# Patient Record
Sex: FEMALE | Race: WHITE | NOT HISPANIC OR LATINO | Employment: FULL TIME | URBAN - NONMETROPOLITAN AREA
[De-identification: names, ages, dates, MRNs, and addresses within clinical notes are randomized per-mention and may not be internally consistent; named-entity substitution may affect disease eponyms.]

---

## 2020-09-16 PROCEDURE — U0004 COV-19 TEST NON-CDC HGH THRU: HCPCS | Performed by: NURSE PRACTITIONER

## 2021-10-31 ENCOUNTER — APPOINTMENT (OUTPATIENT)
Dept: CT IMAGING | Facility: HOSPITAL | Age: 51
End: 2021-10-31

## 2021-10-31 ENCOUNTER — HOSPITAL ENCOUNTER (EMERGENCY)
Facility: HOSPITAL | Age: 51
Discharge: HOME | End: 2021-10-31
Attending: EMERGENCY MEDICINE | Admitting: EMERGENCY MEDICINE

## 2021-10-31 ENCOUNTER — APPOINTMENT (OUTPATIENT)
Dept: GENERAL RADIOLOGY | Facility: HOSPITAL | Age: 51
End: 2021-10-31

## 2021-10-31 VITALS
TEMPERATURE: 98.2 F | SYSTOLIC BLOOD PRESSURE: 110 MMHG | HEIGHT: 66 IN | DIASTOLIC BLOOD PRESSURE: 68 MMHG | OXYGEN SATURATION: 94 % | HEART RATE: 74 BPM | RESPIRATION RATE: 18 BRPM | BODY MASS INDEX: 35.58 KG/M2 | WEIGHT: 221.4 LBS

## 2021-10-31 DIAGNOSIS — R53.1 WEAKNESS: Primary | ICD-10-CM

## 2021-10-31 LAB
ALBUMIN SERPL-MCNC: 4.5 G/DL (ref 3.5–5.2)
ALBUMIN/GLOB SERPL: 1.6 G/DL
ALP SERPL-CCNC: 89 U/L (ref 39–117)
ALT SERPL W P-5'-P-CCNC: 42 U/L (ref 1–33)
ANION GAP SERPL CALCULATED.3IONS-SCNC: 11.1 MMOL/L (ref 5–15)
AST SERPL-CCNC: 38 U/L (ref 1–32)
BACTERIA UR QL AUTO: ABNORMAL /HPF
BASOPHILS # BLD AUTO: 0.09 10*3/MM3 (ref 0–0.2)
BASOPHILS NFR BLD AUTO: 0.9 % (ref 0–1.5)
BILIRUB SERPL-MCNC: 0.3 MG/DL (ref 0–1.2)
BILIRUB UR QL STRIP: NEGATIVE
BUN SERPL-MCNC: 10 MG/DL (ref 6–20)
BUN/CREAT SERPL: 10.4 (ref 7–25)
CALCIUM SPEC-SCNC: 9.4 MG/DL (ref 8.6–10.5)
CHLORIDE SERPL-SCNC: 105 MMOL/L (ref 98–107)
CLARITY UR: CLEAR
CO2 SERPL-SCNC: 24.9 MMOL/L (ref 22–29)
COLOR UR: YELLOW
CREAT SERPL-MCNC: 0.96 MG/DL (ref 0.57–1)
DEPRECATED RDW RBC AUTO: 43.3 FL (ref 37–54)
EOSINOPHIL # BLD AUTO: 0.38 10*3/MM3 (ref 0–0.4)
EOSINOPHIL NFR BLD AUTO: 3.9 % (ref 0.3–6.2)
ERYTHROCYTE [DISTWIDTH] IN BLOOD BY AUTOMATED COUNT: 13.9 % (ref 12.3–15.4)
GFR SERPL CREATININE-BSD FRML MDRD: 61 ML/MIN/1.73
GLOBULIN UR ELPH-MCNC: 2.9 GM/DL
GLUCOSE SERPL-MCNC: 120 MG/DL (ref 65–99)
GLUCOSE UR STRIP-MCNC: NEGATIVE MG/DL
HCT VFR BLD AUTO: 48.2 % (ref 34–46.6)
HGB BLD-MCNC: 15.8 G/DL (ref 12–15.9)
HGB UR QL STRIP.AUTO: NEGATIVE
HOLD SPECIMEN: NORMAL
HOLD SPECIMEN: NORMAL
HYALINE CASTS UR QL AUTO: ABNORMAL /LPF
IMM GRANULOCYTES # BLD AUTO: 0.03 10*3/MM3 (ref 0–0.05)
IMM GRANULOCYTES NFR BLD AUTO: 0.3 % (ref 0–0.5)
KETONES UR QL STRIP: NEGATIVE
LEUKOCYTE ESTERASE UR QL STRIP.AUTO: ABNORMAL
LYMPHOCYTES # BLD AUTO: 2.32 10*3/MM3 (ref 0.7–3.1)
LYMPHOCYTES NFR BLD AUTO: 23.7 % (ref 19.6–45.3)
MAGNESIUM SERPL-MCNC: 1.9 MG/DL (ref 1.6–2.6)
MCH RBC QN AUTO: 28 PG (ref 26.6–33)
MCHC RBC AUTO-ENTMCNC: 32.8 G/DL (ref 31.5–35.7)
MCV RBC AUTO: 85.3 FL (ref 79–97)
MONOCYTES # BLD AUTO: 0.49 10*3/MM3 (ref 0.1–0.9)
MONOCYTES NFR BLD AUTO: 5 % (ref 5–12)
NEUTROPHILS NFR BLD AUTO: 6.46 10*3/MM3 (ref 1.7–7)
NEUTROPHILS NFR BLD AUTO: 66.2 % (ref 42.7–76)
NITRITE UR QL STRIP: NEGATIVE
NRBC BLD AUTO-RTO: 0 /100 WBC (ref 0–0.2)
PH UR STRIP.AUTO: 7 [PH] (ref 5–8)
PLATELET # BLD AUTO: 210 10*3/MM3 (ref 140–450)
PMV BLD AUTO: 10.7 FL (ref 6–12)
POTASSIUM SERPL-SCNC: 3.7 MMOL/L (ref 3.5–5.2)
PROT SERPL-MCNC: 7.4 G/DL (ref 6–8.5)
PROT UR QL STRIP: NEGATIVE
RBC # BLD AUTO: 5.65 10*6/MM3 (ref 3.77–5.28)
RBC # UR: ABNORMAL /HPF
REF LAB TEST METHOD: ABNORMAL
SODIUM SERPL-SCNC: 141 MMOL/L (ref 136–145)
SP GR UR STRIP: <=1.005 (ref 1–1.03)
SQUAMOUS #/AREA URNS HPF: ABNORMAL /HPF
T4 FREE SERPL-MCNC: 0.98 NG/DL (ref 0.93–1.7)
TROPONIN T SERPL-MCNC: <0.01 NG/ML (ref 0–0.03)
TSH SERPL DL<=0.05 MIU/L-ACNC: 1.83 UIU/ML (ref 0.27–4.2)
UROBILINOGEN UR QL STRIP: ABNORMAL
WBC # BLD AUTO: 9.77 10*3/MM3 (ref 3.4–10.8)
WBC UR QL AUTO: ABNORMAL /HPF
WHOLE BLOOD HOLD SPECIMEN: NORMAL
WHOLE BLOOD HOLD SPECIMEN: NORMAL

## 2021-10-31 PROCEDURE — 84484 ASSAY OF TROPONIN QUANT: CPT

## 2021-10-31 PROCEDURE — 71045 X-RAY EXAM CHEST 1 VIEW: CPT

## 2021-10-31 PROCEDURE — 80053 COMPREHEN METABOLIC PANEL: CPT

## 2021-10-31 PROCEDURE — 63710000001 ONDANSETRON ODT 4 MG TABLET DISPERSIBLE: Performed by: NURSE PRACTITIONER

## 2021-10-31 PROCEDURE — 84439 ASSAY OF FREE THYROXINE: CPT | Performed by: NURSE PRACTITIONER

## 2021-10-31 PROCEDURE — 81001 URINALYSIS AUTO W/SCOPE: CPT | Performed by: NURSE PRACTITIONER

## 2021-10-31 PROCEDURE — 85025 COMPLETE CBC W/AUTO DIFF WBC: CPT

## 2021-10-31 PROCEDURE — 83735 ASSAY OF MAGNESIUM: CPT

## 2021-10-31 PROCEDURE — 93005 ELECTROCARDIOGRAM TRACING: CPT

## 2021-10-31 PROCEDURE — 84443 ASSAY THYROID STIM HORMONE: CPT | Performed by: NURSE PRACTITIONER

## 2021-10-31 PROCEDURE — 99283 EMERGENCY DEPT VISIT LOW MDM: CPT

## 2021-10-31 PROCEDURE — 70450 CT HEAD/BRAIN W/O DYE: CPT

## 2021-10-31 RX ORDER — ONDANSETRON 4 MG/1
4 TABLET, ORALLY DISINTEGRATING ORAL ONCE
Status: COMPLETED | OUTPATIENT
Start: 2021-10-31 | End: 2021-10-31

## 2021-10-31 RX ORDER — MECLIZINE HYDROCHLORIDE 25 MG/1
25 TABLET ORAL ONCE
Status: COMPLETED | OUTPATIENT
Start: 2021-10-31 | End: 2021-10-31

## 2021-10-31 RX ORDER — SODIUM CHLORIDE 0.9 % (FLUSH) 0.9 %
10 SYRINGE (ML) INJECTION AS NEEDED
Status: DISCONTINUED | OUTPATIENT
Start: 2021-10-31 | End: 2021-10-31 | Stop reason: HOSPADM

## 2021-10-31 RX ADMIN — MECLIZINE HYDROCHLORIDE 25 MG: 25 TABLET ORAL at 15:08

## 2021-10-31 RX ADMIN — ONDANSETRON 4 MG: 4 TABLET, ORALLY DISINTEGRATING ORAL at 15:08

## 2023-01-22 ENCOUNTER — APPOINTMENT (OUTPATIENT)
Dept: GENERAL RADIOLOGY | Facility: HOSPITAL | Age: 53
End: 2023-01-22
Payer: COMMERCIAL

## 2023-01-22 ENCOUNTER — APPOINTMENT (OUTPATIENT)
Dept: CT IMAGING | Facility: HOSPITAL | Age: 53
End: 2023-01-22
Payer: COMMERCIAL

## 2023-01-22 ENCOUNTER — HOSPITAL ENCOUNTER (OUTPATIENT)
Facility: HOSPITAL | Age: 53
Setting detail: OBSERVATION
Discharge: HOME | End: 2023-01-23
Attending: EMERGENCY MEDICINE | Admitting: FAMILY MEDICINE
Payer: COMMERCIAL

## 2023-01-22 DIAGNOSIS — R20.2 PARESTHESIAS: ICD-10-CM

## 2023-01-22 DIAGNOSIS — R42 DIZZINESS: Primary | ICD-10-CM

## 2023-01-22 LAB
ALBUMIN SERPL-MCNC: 4.3 G/DL (ref 3.5–5.2)
ALBUMIN/GLOB SERPL: 1.3 G/DL
ALP SERPL-CCNC: 104 U/L (ref 39–117)
ALT SERPL W P-5'-P-CCNC: 55 U/L (ref 1–33)
ANION GAP SERPL CALCULATED.3IONS-SCNC: 11.6 MMOL/L (ref 5–15)
AST SERPL-CCNC: 47 U/L (ref 1–32)
BACTERIA UR QL AUTO: ABNORMAL /HPF
BASOPHILS # BLD AUTO: 0.08 10*3/MM3 (ref 0–0.2)
BASOPHILS NFR BLD AUTO: 1.1 % (ref 0–1.5)
BILIRUB SERPL-MCNC: 0.3 MG/DL (ref 0–1.2)
BILIRUB UR QL STRIP: NEGATIVE
BUN SERPL-MCNC: 10 MG/DL (ref 6–20)
BUN/CREAT SERPL: 13.7 (ref 7–25)
CALCIUM SPEC-SCNC: 9.4 MG/DL (ref 8.6–10.5)
CHLORIDE SERPL-SCNC: 105 MMOL/L (ref 98–107)
CHOLEST SERPL-MCNC: 224 MG/DL (ref 0–200)
CLARITY UR: CLEAR
CO2 SERPL-SCNC: 23.4 MMOL/L (ref 22–29)
COLOR UR: YELLOW
CREAT SERPL-MCNC: 0.73 MG/DL (ref 0.57–1)
CRP SERPL-MCNC: 0.84 MG/DL (ref 0–0.5)
DEPRECATED RDW RBC AUTO: 45.6 FL (ref 37–54)
EGFRCR SERPLBLD CKD-EPI 2021: 97.9 ML/MIN/1.73
EOSINOPHIL # BLD AUTO: 0.66 10*3/MM3 (ref 0–0.4)
EOSINOPHIL NFR BLD AUTO: 9.1 % (ref 0.3–6.2)
ERYTHROCYTE [DISTWIDTH] IN BLOOD BY AUTOMATED COUNT: 14.3 % (ref 12.3–15.4)
ERYTHROCYTE [SEDIMENTATION RATE] IN BLOOD: 25 MM/HR (ref 0–30)
FLUAV RNA RESP QL NAA+PROBE: NOT DETECTED
FLUBV RNA RESP QL NAA+PROBE: NOT DETECTED
GLOBULIN UR ELPH-MCNC: 3.3 GM/DL
GLUCOSE SERPL-MCNC: 113 MG/DL (ref 65–99)
GLUCOSE UR STRIP-MCNC: NEGATIVE MG/DL
HBA1C MFR BLD: 6.6 % (ref 4.8–5.6)
HCT VFR BLD AUTO: 46 % (ref 34–46.6)
HDLC SERPL-MCNC: 41 MG/DL (ref 40–60)
HGB BLD-MCNC: 14.7 G/DL (ref 12–15.9)
HGB UR QL STRIP.AUTO: NEGATIVE
HOLD SPECIMEN: NORMAL
HOLD SPECIMEN: NORMAL
HYALINE CASTS UR QL AUTO: ABNORMAL /LPF
IMM GRANULOCYTES # BLD AUTO: 0.03 10*3/MM3 (ref 0–0.05)
IMM GRANULOCYTES NFR BLD AUTO: 0.4 % (ref 0–0.5)
KETONES UR QL STRIP: NEGATIVE
LDLC SERPL CALC-MCNC: 144 MG/DL (ref 0–100)
LDLC/HDLC SERPL: 3.4 {RATIO}
LEUKOCYTE ESTERASE UR QL STRIP.AUTO: ABNORMAL
LYMPHOCYTES # BLD AUTO: 2.56 10*3/MM3 (ref 0.7–3.1)
LYMPHOCYTES NFR BLD AUTO: 35.3 % (ref 19.6–45.3)
MCH RBC QN AUTO: 27.7 PG (ref 26.6–33)
MCHC RBC AUTO-ENTMCNC: 32 G/DL (ref 31.5–35.7)
MCV RBC AUTO: 86.6 FL (ref 79–97)
MONOCYTES # BLD AUTO: 0.51 10*3/MM3 (ref 0.1–0.9)
MONOCYTES NFR BLD AUTO: 7 % (ref 5–12)
NEUTROPHILS NFR BLD AUTO: 3.42 10*3/MM3 (ref 1.7–7)
NEUTROPHILS NFR BLD AUTO: 47.1 % (ref 42.7–76)
NITRITE UR QL STRIP: NEGATIVE
NRBC BLD AUTO-RTO: 0 /100 WBC (ref 0–0.2)
NT-PROBNP SERPL-MCNC: 54.1 PG/ML (ref 0–900)
PH UR STRIP.AUTO: 6.5 [PH] (ref 5–8)
PLATELET # BLD AUTO: 197 10*3/MM3 (ref 140–450)
PMV BLD AUTO: 10.7 FL (ref 6–12)
POTASSIUM SERPL-SCNC: 4 MMOL/L (ref 3.5–5.2)
PROCALCITONIN SERPL-MCNC: 0.09 NG/ML (ref 0–0.25)
PROT SERPL-MCNC: 7.6 G/DL (ref 6–8.5)
PROT UR QL STRIP: NEGATIVE
RBC # BLD AUTO: 5.31 10*6/MM3 (ref 3.77–5.28)
RBC # UR STRIP: ABNORMAL /HPF
REF LAB TEST METHOD: ABNORMAL
SARS-COV-2 RNA RESP QL NAA+PROBE: NOT DETECTED
SODIUM SERPL-SCNC: 140 MMOL/L (ref 136–145)
SP GR UR STRIP: <=1.005 (ref 1–1.03)
SQUAMOUS #/AREA URNS HPF: ABNORMAL /HPF
TRIGL SERPL-MCNC: 217 MG/DL (ref 0–150)
TROPONIN T SERPL-MCNC: <0.01 NG/ML (ref 0–0.03)
TSH SERPL DL<=0.05 MIU/L-ACNC: 3.91 UIU/ML (ref 0.27–4.2)
UROBILINOGEN UR QL STRIP: ABNORMAL
VIT B12 BLD-MCNC: 289 PG/ML (ref 211–946)
VLDLC SERPL-MCNC: 39 MG/DL (ref 5–40)
WBC # UR STRIP: ABNORMAL /HPF
WBC NRBC COR # BLD: 7.26 10*3/MM3 (ref 3.4–10.8)
WHOLE BLOOD HOLD COAG: NORMAL
WHOLE BLOOD HOLD SPECIMEN: NORMAL

## 2023-01-22 PROCEDURE — 93005 ELECTROCARDIOGRAM TRACING: CPT

## 2023-01-22 PROCEDURE — 99223 1ST HOSP IP/OBS HIGH 75: CPT | Performed by: NURSE PRACTITIONER

## 2023-01-22 PROCEDURE — 82607 VITAMIN B-12: CPT | Performed by: NURSE PRACTITIONER

## 2023-01-22 PROCEDURE — 84484 ASSAY OF TROPONIN QUANT: CPT | Performed by: EMERGENCY MEDICINE

## 2023-01-22 PROCEDURE — 84145 PROCALCITONIN (PCT): CPT | Performed by: EMERGENCY MEDICINE

## 2023-01-22 PROCEDURE — 80050 GENERAL HEALTH PANEL: CPT | Performed by: EMERGENCY MEDICINE

## 2023-01-22 PROCEDURE — 36415 COLL VENOUS BLD VENIPUNCTURE: CPT

## 2023-01-22 PROCEDURE — G0378 HOSPITAL OBSERVATION PER HR: HCPCS

## 2023-01-22 PROCEDURE — 71045 X-RAY EXAM CHEST 1 VIEW: CPT

## 2023-01-22 PROCEDURE — 85652 RBC SED RATE AUTOMATED: CPT | Performed by: EMERGENCY MEDICINE

## 2023-01-22 PROCEDURE — 83880 ASSAY OF NATRIURETIC PEPTIDE: CPT | Performed by: EMERGENCY MEDICINE

## 2023-01-22 PROCEDURE — 99285 EMERGENCY DEPT VISIT HI MDM: CPT

## 2023-01-22 PROCEDURE — 99284 EMERGENCY DEPT VISIT MOD MDM: CPT

## 2023-01-22 PROCEDURE — 83036 HEMOGLOBIN GLYCOSYLATED A1C: CPT | Performed by: NURSE PRACTITIONER

## 2023-01-22 PROCEDURE — 87086 URINE CULTURE/COLONY COUNT: CPT | Performed by: EMERGENCY MEDICINE

## 2023-01-22 PROCEDURE — 93005 ELECTROCARDIOGRAM TRACING: CPT | Performed by: EMERGENCY MEDICINE

## 2023-01-22 PROCEDURE — C9803 HOPD COVID-19 SPEC COLLECT: HCPCS

## 2023-01-22 PROCEDURE — 70450 CT HEAD/BRAIN W/O DYE: CPT

## 2023-01-22 PROCEDURE — 81001 URINALYSIS AUTO W/SCOPE: CPT | Performed by: EMERGENCY MEDICINE

## 2023-01-22 PROCEDURE — 87636 SARSCOV2 & INF A&B AMP PRB: CPT | Performed by: EMERGENCY MEDICINE

## 2023-01-22 PROCEDURE — 86140 C-REACTIVE PROTEIN: CPT | Performed by: EMERGENCY MEDICINE

## 2023-01-22 PROCEDURE — 80061 LIPID PANEL: CPT | Performed by: NURSE PRACTITIONER

## 2023-01-22 RX ORDER — ACETAMINOPHEN 325 MG/1
650 TABLET ORAL EVERY 4 HOURS PRN
Status: DISCONTINUED | OUTPATIENT
Start: 2023-01-22 | End: 2023-01-23 | Stop reason: HOSPADM

## 2023-01-22 RX ORDER — SODIUM CHLORIDE 0.9 % (FLUSH) 0.9 %
10 SYRINGE (ML) INJECTION AS NEEDED
Status: DISCONTINUED | OUTPATIENT
Start: 2023-01-22 | End: 2023-01-23 | Stop reason: HOSPADM

## 2023-01-22 RX ORDER — NICOTINE 21 MG/24HR
1 PATCH, TRANSDERMAL 24 HOURS TRANSDERMAL DAILY PRN
Status: DISCONTINUED | OUTPATIENT
Start: 2023-01-22 | End: 2023-01-23 | Stop reason: HOSPADM

## 2023-01-22 RX ORDER — CEPHALEXIN 250 MG/1
500 CAPSULE ORAL ONCE
Status: COMPLETED | OUTPATIENT
Start: 2023-01-22 | End: 2023-01-22

## 2023-01-22 RX ORDER — SODIUM CHLORIDE 9 MG/ML
40 INJECTION, SOLUTION INTRAVENOUS AS NEEDED
Status: DISCONTINUED | OUTPATIENT
Start: 2023-01-22 | End: 2023-01-23 | Stop reason: HOSPADM

## 2023-01-22 RX ORDER — SODIUM CHLORIDE 0.9 % (FLUSH) 0.9 %
10 SYRINGE (ML) INJECTION EVERY 12 HOURS SCHEDULED
Status: DISCONTINUED | OUTPATIENT
Start: 2023-01-22 | End: 2023-01-23 | Stop reason: HOSPADM

## 2023-01-22 RX ORDER — CHOLECALCIFEROL (VITAMIN D3) 125 MCG
5 CAPSULE ORAL NIGHTLY PRN
Status: DISCONTINUED | OUTPATIENT
Start: 2023-01-22 | End: 2023-01-23 | Stop reason: HOSPADM

## 2023-01-22 RX ORDER — SODIUM CHLORIDE 0.9 % (FLUSH) 0.9 %
10 SYRINGE (ML) INJECTION AS NEEDED
Status: DISCONTINUED | OUTPATIENT
Start: 2023-01-22 | End: 2023-01-23 | Stop reason: SDUPTHER

## 2023-01-22 RX ADMIN — Medication 10 ML: at 22:06

## 2023-01-22 RX ADMIN — RIVAROXABAN 20 MG: 10 TABLET, FILM COATED ORAL at 13:24

## 2023-01-22 RX ADMIN — CEPHALEXIN 500 MG: 250 CAPSULE ORAL at 13:24

## 2023-01-22 NOTE — CONSULTS
Teleneurology Phone Consultation Note    Date of Consultation: 1/22/2023  Requesting Attending: Ramya Reich, *  Chief Complaint: Light-headedness, left arm heaviness with numbness / paresthesias.   Location of patient: Emergency Room  Last known well date/Time if applicable: Unclear, sx for 72 hrs.   Date/Time Telestroke Doctor on phone: 1/22/2023 @ 1040    Assessment and Plan:  TNK decision: No Outside window for tPA  Intervention Decision: Symptoms > 72 hrs, not c/w LVO syndrome.     Impression: Patient with history of antiphospholipid syndrome, alpha-1-antitrypsin deficiency syndrome, prior thrombotic events on Xarelto, and systemic sarcoidosis presents to the ED today with persistent symptoms of dizziness (lightheadedness) and left arm numbness / tingling with sensation of heaviness over the past three days. Some early fluctuation in left arm symptoms during the first 24 hrs - symptoms have reportedly been stable and persistent for 48 hrs. ED MD evaluation remarkable for right facial asymmetry (baseline per patient) and numbness / sensory discrepancy on left (NIHSS 2). SBPs 150s with HR 70s. No fever at triage.     Recommendations  - Continue neuro assessments and vital signs Q4 hrs with continuous telemetry. Please notify on-call Neurology with any abrupt change in exam.   - NCCT head to r/o ischemic, hemorrhagic processes in ED.   - CTA of the head and neck with any significant increase in neurological deficits - symptoms reported stable over the past 48 hrs and non-disabling. Would also recommend CTA if any evidence for ischemic stroke on MRI.   - Recommend MRI of the brain and cervical spine with and without contrast - r/o ischemic, evaluate for extremity symptoms due to autoimmune demyelinating disease involving brain / spine.   - Given systemic AC therapy and sx > 48 hrs, recommend mild permissive HTN to SBP < 150 mmHg x additional 24-48 hrs pending MRI evaluation, provided no contraindications  to permissive HTN (severe CHF, CAD, etc).   - Following initial period of permissive HTN, slow titration of oral antihypertensive medications to goal BP < 130/80 mmHg as tolerated.   - Continue Xarelto as per home regimen - add atorvastatin 80 mg PO QHS if evidence for ischemic stroke on MRI.   - Stroke labs: Hgb A1c / lipid panel / TSH, free T4 / Vit B12 / Vit D level. Check ESR, CRP, procalcitonin, U/A, CXR, and screen for COVID and influenza.  - Request records from outside Rheumatology, Pulmonology, and/or Neurology evaluation as available.    - Transthoracic echo if evidence for stroke.    - Delirium precautions: Lights on, shades open from 0700 to 1900 daily with frequent reorientation. Typical risk factors for delirium include advanced age, premorbid neurological disease, significant impairment in hearing or vision, critical illness, or prolonged hospital admission.   - Continued PT/OT/SLP evaluation for any post-discharge rehab needs.   - Will need follow-up evaluation in Neurology Clinic w/in 4-6 weeks following discharge.     Thank you for the opportunity to participate in the care of this patient. We will follow along with you.     If you have any questions about the patient recommendations, please call [phone] at anytime and ask to speak with the neurologist on call. Press 1 for an emergent consult and 2 for a non-emergent consult.    Chelsi Goncalves MD   Teleneurology Consultant     Teleneurology Disclaimer  Initial recommendations are based on my read of the source images. As additional reconstructions are generated, the final report of the neuroimages may change. Primary service to contact me back if the final report from radiology differs from my preliminary reading.     Also, these recommendations were developed based on phone conversation with the patient provider at the time of the initial consultation. The treatment plan and investigations may need to be modified based on additional  information that may become available during the course of this hospitalization. We defer further workup/management to the local neurology team.     I, Chelsi Goncalves MD, was consulted about this patient on 1/22/2023 for discussion over the phone regarding management of the patient's care via a provider to provider discussion. The location of the patient was at [facility] My location was [place], [place]. The recommendations are based off information I received from the consulting provider.

## 2023-01-22 NOTE — ED PROVIDER NOTES
TRIAGE CHIEF COMPLAINT:     Nursing and triage notes reviewed    Chief Complaint   Patient presents with   • Shortness of Breath   • Dizziness   • Tingling      HPI: Shanon Bean is a 52 y.o. female who presents to the emergency department complaining of shortness of breath, dizziness, tingling and heaviness of her left upper extremity.  Patient states that symptoms have been intermittently present over the past 3 days, however they have been constantly present since yesterday evening.  Patient states it feels like her left arm is asleep and it feels heavy and tingly but does not feel completely numb.  She states it feels difficult to take a deep breath but she denies any pain with breathing.  She denies any worsening cough.  She denies chest pain.  She denies headache, changes in her vision.  She denies any pain in her neck.    REVIEW OF SYSTEMS: All other systems reviewed and are negative     PAST MEDICAL HISTORY:   Past Medical History:   Diagnosis Date   • Alpha 1-antitrypsin PiMS phenotype    • Anti-phospholipid syndrome (HCC)    • Blood clotting disorder (HCC)    • Sarcoidosis         FAMILY HISTORY:   History reviewed. No pertinent family history.     SOCIAL HISTORY:   Social History     Socioeconomic History   • Marital status:    Tobacco Use   • Smoking status: Every Day     Packs/day: 0.50     Types: Cigarettes   Vaping Use   • Vaping Use: Never used   Substance and Sexual Activity   • Alcohol use: Never   • Drug use: Never   • Sexual activity: Defer        SURGICAL HISTORY:   Past Surgical History:   Procedure Laterality Date   •  SECTION     • CHOLECYSTECTOMY     • SINUS SURGERY          CURRENT MEDICATIONS:      Medication List      ASK your doctor about these medications    Xarelto 20 MG tablet  Generic drug: rivaroxaban             ALLERGIES: Penicillins and Sulfa antibiotics     PHYSICAL EXAM:   VITAL SIGNS:   Vitals:    23 0952   BP: 152/87   Pulse: 77   Resp: 16   Temp: 98 °F  (36.7 °C)   SpO2: 97%      CONSTITUTIONAL: Awake, oriented, appears nontoxic   HENT: Atraumatic, normocephalic, oral mucosa pink and moist, airway patent. Nares patent without drainage. External ears normal.   EYES: Conjunctivae clear, EOMI, PERRL   NECK: Trachea midline, nontender, supple   CARDIOVASCULAR: Normal heart rate, Normal rhythm, No murmurs, rubs, gallops   PULMONARY/CHEST: Clear to auscultation, no rhonchi, wheezes, or rales. Symmetrical breath sounds.    ABDOMINAL: Nondistended, soft, nontender - no rebound or guarding.  NEUROLOGIC: Patient has some slight asymmetry to her smile, she states that she has a history of nerve damage and this is not different from her baseline.  Otherwise no cranial nerve deficits appreciated.  Patient has no pronator drift or weakness in the left upper extremity when compared to the right.  She does describe [place] for instance sensation with touching on the opposite extremities.  Lower extremities are normal in strength and sensation.  EXTREMITIES: No clubbing, cyanosis, or edema   SKIN: Warm, Dry, No erythema, No rash     ED COURSE / MEDICAL DECISION MAKING:   Shanon Bean is a 52 y.o. female who presents to the emergency department for evaluation of dizziness, shortness of breath, paresthesias and heaviness of the left upper extremity.  Patient appears nondistressed on arrival in the emergency department.  Vital signs are stable on arrival.  Physical examination does reveal some difference in sensation in the extremities but otherwise no acute new deficit.  Overall patient appears well.    Differential diagnosis includes CVA, vertigo, anxiety, ACS, pneumonia among other etiologies.    CT scan of patient's head, chest x-ray, EKG, was ordered for further evaluation of the patient's presentation.    Chart review including any outside testing was performed including previous imaging studies and notes.  I did consult with the neurologist on-call after patient's interview and  examination.  They have recommended also adding a CRP, sed rate, TSH, urinalysis, and COVID and influenza screen.  They recommended admission for further work-up as well as MRI of the head and cervical spine    Patient was treated with observation initially on arrival.    EKG interpreted by me reveals sinus rhythm with rate of 75 bpm.  There is low QRS voltage in chest leads.  This is an atypical appearing EKG.    Laboratory testing per my evaluation does reveal some evidence of a urinary infection, patient was started on antibiotics for this.  COVID and influenza screens are negative.  Inflammatory markers are largely within the normal range as are cardiac enzymes.  Labs are otherwise largely clinically unremarkable.  Chest x-ray per radiology interpretation does not reveal any obvious acute process at this time.  CT scan of the head per radiology interpretation does not reveal any obvious acute process.  I discussed all of these findings with patient and rationale for admission to the hospital.  Patient did express understanding.  Patient was in agreement with this plan.  In addition to consulting the neurologist I spoke with the hospitalist and discussed the case.  The hospitalist service has agreed to admit the patient for further treatment and evaluation.    Plan for disposition is admission    DECISION TO DISCHARGE/ADMIT: see ED care timeline     FINAL IMPRESSION:   1 --dizziness  2 --paresthesias  3 --     Electronically signed by: Ramya Reich MD, 1/22/2023 10:12 Ramya Morales MD  01/22/23 0657

## 2023-01-22 NOTE — ED NOTES
Called [facility] at this time per Md Damir request. Call unanswered. Left voicemail requesting call back.

## 2023-01-22 NOTE — PLAN OF CARE
Goal Outcome Evaluation:  Plan of Care Reviewed With: patient      Interventions implemented as appropriate.

## 2023-01-23 ENCOUNTER — APPOINTMENT (OUTPATIENT)
Dept: MRI IMAGING | Facility: HOSPITAL | Age: 53
End: 2023-01-23
Payer: COMMERCIAL

## 2023-01-23 VITALS
TEMPERATURE: 98 F | HEIGHT: 66 IN | WEIGHT: 215.39 LBS | RESPIRATION RATE: 18 BRPM | OXYGEN SATURATION: 96 % | DIASTOLIC BLOOD PRESSURE: 71 MMHG | HEART RATE: 57 BPM | BODY MASS INDEX: 34.62 KG/M2 | SYSTOLIC BLOOD PRESSURE: 120 MMHG

## 2023-01-23 PROBLEM — R20.2 PARESTHESIAS: Status: ACTIVE | Noted: 2023-01-23

## 2023-01-23 LAB — BACTERIA SPEC AEROBE CULT: NORMAL

## 2023-01-23 PROCEDURE — 99239 HOSP IP/OBS DSCHRG MGMT >30: CPT | Performed by: NURSE PRACTITIONER

## 2023-01-23 PROCEDURE — 70551 MRI BRAIN STEM W/O DYE: CPT

## 2023-01-23 PROCEDURE — G0378 HOSPITAL OBSERVATION PER HR: HCPCS

## 2023-01-23 PROCEDURE — 72141 MRI NECK SPINE W/O DYE: CPT

## 2023-01-23 RX ADMIN — RIVAROXABAN 20 MG: 10 TABLET, FILM COATED ORAL at 11:33

## 2023-01-23 NOTE — PROGRESS NOTES
TeleNeuro Brief Note    Patient down in MRI, could not do video visit.    Reviewed chart hx of APLS, on Xarelto.  Presented with lightheadedness and numbness tingling.    Getting MRI brain and C spine  If both neg, no further neuro workup needed.  Continue current regimen. Follow up outpatient.    Cristobal Feliz MD

## 2023-01-23 NOTE — DISCHARGE SUMMARY
[facility]   DISCHARGE SUMMARY      Name:  Shanon Bean   Age:  52 y.o.  Sex:  female  :  1970  MRN:  [id number]   Visit Number:  [id number]    Admission Date:  2023  Date of Discharge:  2023  Primary Care Physician:  Carmen Barney MD    Important issues to note:    -Patient admitted secondary to left upper extremity paresthesias greater than right.  -Given past medical history neurology recommended MRI of brain prior to discharge which was negative.  -MRI of C-spine did reveal several protrusions for which patient will follow with neurosurgery if needed.  -Recommend outpatient nerve conduction studies for bilateral paresthesias as could be related to carpal tunnel syndrome.  -Increase water intake.  -Tobacco cessation encouraged.  -Patient to follow with PCP in regards to prediabetes with A1c 6.6 and hyperlipidemia.  -Strict return precautions given.    Discharge Diagnoses:     1. Bilateral arm paraesthesias, POA  2. Dizziness, POA  3. Antiphospholipid Syndrome  4. Alpha Antitrypsin 1 Deficiency  5. Tobacco dependence  6. History of DVT's on Xarelto  7.  Sarcoidosis  8. Prediabetes  9. HLD      Problem List:     Active Hospital Problems    Diagnosis  POA   • **Dizziness [R42]  Yes   • Paresthesias [R20.2]  Yes      Resolved Hospital Problems   No resolved problems to display.     Presenting Problem:    Chief Complaint   Patient presents with   • Shortness of Breath   • Dizziness   • Tingling      Consults:     Consulting Physician(s)     Provider Relationship Specialty    Chelsi Goncalves MD Consulting Physician Neurology        Procedures Performed:        History of presenting illness/Hospital Course:    Mrs. Sandoval is a 52-year-old female that presented to the emergency department with a 3-day history of left arm heaviness/numbness.  Pertinent past medical history includes sarcoidosis for which she follows with Dr. 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  Patient also is experiencing associated dizziness and lightheadedness.  Stated that she has numbness and heaviness up to her left bicep which is constant.  Of note patient also has some numbness to her right hand which is intermittent.  Patient denied any associated chest pain, shortness of air, fever, anxiety, urinary symptoms, confusion, or unilateral weakness.  Given patient prior history patient became very concerned as her left upper extremity numbness was worsening.  Of note patient also had similar episode in October 2021.     Upon ED presentation patient hemodynamically stable on room air.  Pertinent labs and imaging include negative COVID-19/influenza swab, urinalysis without pattern of infection noted, mildly elevated LFTs, CRP 0.84, TSH/procalcitonin /BNP /troponin WNL.  CT of head without any evidence of acute hemorrhage mass-effect or midline shift.  Given history neurology was consulted and recommended MRI of brain with C-spine in the a.m.  Hospitalist consulted for further medical management.  MRI brain without any acute abnormalities.  MRI of C-spine revealed right paracentral protrusion at C6-C7.  Small central protrusions at C4-C5.  Patient updated on results and encouraged to follow with PCP for outpatient EMG.  Patient also scheduled to see neurosurgery if needed.  Encouraged patient to discuss with PCP prediabetes and hyperlipidemia noted during hospitalization for therapeutic lifestyle changes versus new medications.  Patient without any further neurological deficits.  Strict return precautions given.    Vital Signs:    Temp:  [97.6 °F (36.4 °C)-98.6 °F (37 °C)] 98 °F (36.7 °C)  Heart Rate:  [57-84] 57  Resp:  [18-20] 18  BP: (112-153)/(60-71) 120/71    Physical Exam:    General Appearance:  Alert and cooperative.  Pleasant middle-aged  female.   Head:  Atraumatic and normocephalic.  Chronic right-sided facial droop.   Eyes: Conjunctivae and sclerae normal, no icterus. No pallor.   Ears:  Ears with no abnormalities noted.   Throat: No oral lesions, no thrush, oral mucosa moist.   Neck: Supple, trachea midline, no thyromegaly.   Back:   No kyphoscoliosis present. No tenderness to palpation.   Lungs:   Breath sounds heard bilaterally equally.  No crackles or wheezing. No Pleural rub or bronchial breathing.  On room air unlabored.   Heart:  Normal S1 and S2, no murmur, no gallop, no rub. No JVD.   Abdomen:   Normal bowel sounds, no masses, no organomegaly. Soft, nontender, nondistended, no rebound tenderness.   Extremities: Supple, no edema, no cyanosis, no clubbing.   Pulses: Pulses palpable bilaterally.   Skin: No bleeding or rash.   Neurologic: Alert and oriented x 3. No facial asymmetry. Moves all four limbs. No tremors.     Pertinent Lab Results:     Results from last 7 days   Lab Units 01/22/23  1007   SODIUM mmol/L 140   POTASSIUM mmol/L 4.0   CHLORIDE mmol/L 105   CO2 mmol/L 23.4   BUN mg/dL 10   CREATININE mg/dL 0.73   CALCIUM mg/dL 9.4   BILIRUBIN mg/dL 0.3   ALK PHOS U/L 104   ALT (SGPT) U/L 55*   AST (SGOT) U/L 47*   GLUCOSE mg/dL 113*     Results from last 7 days   Lab Units 01/22/23  1007   WBC 10*3/mm3 7.26   HEMOGLOBIN g/dL 14.7   HEMATOCRIT % 46.0   PLATELETS 10*3/mm3 197         Results from last 7 days   Lab Units 01/22/23  1007   TROPONIN T ng/mL <0.010     Results from last 7 days   Lab Units 01/22/23  1007   PROBNP pg/mL 54.1                 Results from last 7 days   Lab Units 01/22/23  1104   URINECX  25,000 CFU/mL Mixed Pepper Isolated       Pertinent Radiology Results:    Imaging Results (All)     Procedure Component Value Units Date/Time    MRI Brain Without Contrast [[id number]] Collected: 01/23/23 1051     Updated: 01/23/23 1112    Narrative:      PROCEDURE: MRI BRAIN WO CONTRAST-     HISTORY: Neuro deficit, acute, stroke  suspected; R42-Dizziness and  giddiness; R20.2-Paresthesia of skin     TECHNIQUE: Multi-planar imaging was performed of the brain without  contrast administration.       COMPARISON: CT head dated 01/22/2023.     FINDINGS: The gyri and sulci have a normal appearance for age.  There is  no mass effect or midline shift.  There are no areas of abnormal signal  intensity.  There is no hydrocephalus.  The ventricles are symmetric in  size and normal in configuration.  The brainstem and cerebellum are  without acute abnormality. The cerebellar tonsils are low lying.  No  areas of restricted diffusion are present.  Flow voids within the major  intracranial vessels are present.    The soft tissues are within normal  limits.       Impression:      1. No acute intracranial abnormality.  2. Low-lying cerebellar tonsils.                    This report was signed and finalized on 1/23/2023 11:10 AM by Tiffanie Pineda MD.    MRI Cervical Spine Without Contrast [[id number]] Collected: 01/23/23 1052     Updated: 01/23/23 1057    Narrative:      PROCEDURE: MRI CERVICAL SPINE WO CONTRAST-     HISTORY: Neck trauma, focal neuro deficit or paresthesia (Age 16-64y);  R42-Dizziness and giddiness; R20.2-Paresthesia of skin     TECHNIQUE: Multiplanar and multisequence imaging of the cervicalspine  was obtained without contrast.       COMPARISON: None.     FINDINGS: There is normal alignment of the cervical vertebral bodies in  the sagittal plane.  Vertebral body height is preserved. Bone marrow  signal intensity is normal without edema or pathologic marrow  replacement.  Cord signal is normal.  No acute paraspinal abnormality.        C2/3: There is no focal disc herniation, central stenosis, or neural  foraminal narrowing.      C3/4: There is no focal disc herniation, central stenosis, or neural  foraminal narrowing.      C4/5: There is a small central protrusion. No central or foraminal  stenosis.     C5/6: There is an annular disc bulge  with a central protrusion. There  are mild degenerative endplate changes. There is mild central stenosis  and bilateral foraminal narrowing.     C6/7: There is a right paracentral protrusion superimposed on an annular  disc bulge with mild degenerative endplate changes. There is mass effect  upon the anterior thecal sac, asymmetric to the right with mild central  canal stenosis. There is moderate right foraminal narrowing     C7/T1:  There is no focal disc herniation, central canal stenosis, or  neural foraminal narrowing.       Impression:      1. Right paracentral protrusion at C6-7.  2. Small central protrusions at C4-5 and C5-C6 as above.                    This report was signed and finalized on 1/23/2023 10:55 AM by Tiffanie Pineda MD.    CT Head Without Contrast [[id number]] Collected: 01/22/23 1124     Updated: 01/22/23 1127    Narrative:            PROCEDURE: CT HEAD WO CONTRAST-     HISTORY:Paresthesias left upper extremity     COMPARISON:10/31/2021     TECHNIQUE: Multiple axial CT images were performed from the foramen  magnum to the vertex without enhancement.      FINDINGS:There is no evidence of acute hemorrhage. Is no evidence of  mass effect or midline shift. There is no evidence of displaced  fracture. The gray-white junction is intact. Postoperative changes with  bilateral maxillary antrostomies. Consider further assessment with MRI.       Impression:      No evidence of acute hemorrhage, mass effect, or midline  shift.              This study was performed with techniques to keep radiation doses as low  as reasonably achievable (ALARA). Individualized dose reduction  techniques using automated exposure control or adjustment of mA and/or  kV according to the patient size were employed.         This report was signed and finalized on 1/22/2023 11:25 AM by Joseph Lawson DO.    XR Chest 1 View [[id number]] Collected: 01/22/23 1023     Updated: 01/22/23 1026    Narrative:      PROCEDURE: XR CHEST 1 VW-      HISTORY: [facility] Triage Protocol     COMPARISON: 10/31/2021.     FINDINGS: The heart is normal in size. The mediastinum is unremarkable.  The lungs are clear. There is no pneumothorax.  There are no acute  osseous abnormalities.       Impression:      No acute cardiopulmonary process.     Continued followup is recommended.     This report was signed and finalized on 1/22/2023 10:24 AM by Joseph Lawson DO.          Echo:      Condition on Discharge:      Stable.    Code status during the hospital stay:    Code Status and Medical Interventions:   Ordered at: 01/22/23 1450     Level Of Support Discussed With:    Patient     Code Status (Patient has no pulse and is not breathing):    CPR (Attempt to Resuscitate)     Medical Interventions (Patient has pulse or is breathing):    Full Support     Discharge Disposition:    Home or Self Care    Discharge Medications:       Discharge Medications      Continue These Medications      Instructions Start Date   Xarelto 20 MG tablet  Generic drug: rivaroxaban   No dose, route, or frequency recorded.           Discharge Diet:     Diet Instructions     Diet: Consistent Carbohydrate, Cardiac      Discharge Diet:  Consistent Carbohydrate  Cardiac           Activity at Discharge:     Activity Instructions     Activity as Tolerated          Follow-up Appointments:     Follow-up Information     Carmen Barney MD Follow up in 1 week(s).    Specialty: Pediatrics  Contact information:  [address]  [address]  [facility] [id number] [phone]             [facility] Follow up in 1 month(s).    Contact information:  [address]  [address]  [facility] [address] [phone]                     No future appointments.  Test Results Pending at Discharge:           Denisse Ramirez, JESSI  01/23/23  12:32 EST    Time: I spent 34 minutes on this discharge activity which included: face-to-face encounter with the patient, reviewing the data in the system,  coordination of the care with the nursing staff as well as consultants, documentation, and entering orders.     Dictated utilizing Dragon dictation.

## 2023-01-23 NOTE — DISCHARGE INSTRUCTIONS
Patient to be discharged home today.  Would recommend outpatient EMG.  Will need to follow with PCP/Neurosurg.  Increase water intake.  If worsening symptoms return to clinic.  Would recommend following with PCP for recommendations regarding HLD and Prediabetes- TLC vs Metformin/statin.

## 2023-01-23 NOTE — PLAN OF CARE
Goal Outcome Evaluation:  Plan of Care Reviewed With: patient           Outcome Evaluation: Patient to discharge home today.

## 2023-01-23 NOTE — PLAN OF CARE
Goal Outcome Evaluation:  Plan of Care Reviewed With: patient        Progress: no change  Outcome Evaluation: no acute events during shift. VSS, SR on tele. RA. no other change in pt condition. will continue to monitor.

## 2023-01-23 NOTE — CASE MANAGEMENT/SOCIAL WORK
Case Management Discharge Note           Provided Post Acute Provider List?: N/A  N/A Provider List Comment: utilizes [facility] for cpap    Selected Continued Care - Discharged on 1/23/2023 Admission date: 1/22/2023 - Discharge disposition: Home or Self Care    Destination    No services have been selected for the patient.               Transportation Services  Private: Car    Final Discharge Disposition Code: 01 - home or self-care

## 2023-01-23 NOTE — CASE MANAGEMENT/SOCIAL WORK
Discharge Planning Assessment   [place]     Patient Name: Shanon Bean  MRN: [id number]  Today's Date: 1/23/2023    Admit Date: 1/22/2023    Plan: spoke with pt this am; stated plans to go home on d/c; spouse or herself will drive home; utilizes cpap from [facility]; given info on advance directive; agreed to meds to beds; cm info card given and explained; no cm needs noted at that time   Discharge Needs Assessment     Row Name 01/23/23 1411       Living Environment    People in Home spouse    Name(s) of People in Home spouse and 16 year old son    Current Living Arrangements home    Primary Care Provided by self    Provides Primary Care For no one    Family Caregiver if Needed spouse    Quality of Family Relationships supportive    Able to Return to Prior Arrangements yes    Living Arrangement Comments plans home on d/c; spouse will transport       Resource/Environmental Concerns    Resource/Environmental Concerns none    Transportation Concerns none       Transition Planning    Patient/Family Anticipates Transition to home with family    Patient/Family Anticipated Services at Transition none    Transportation Anticipated family or friend will provide;car, drives self       Discharge Needs Assessment    Readmission Within the Last 30 Days no previous admission in last 30 days    Equipment Currently Used at Home cpap    Concerns to be Addressed no discharge needs identified    Anticipated Changes Related to Illness none    Equipment Needed After Discharge none    Provided Post Acute Provider List? N/A    N/A Provider List Comment utilizes [facility] for cpap               Discharge Plan     Row Name 01/23/23 1413       Plan    Plan spoke with pt this am; stated plans to go home on d/c; spouse or herself will drive home; utilizes cpap from [facility]; given info on advance directive; agreed to meds to beds; cm info card given and explained; no cm needs noted at that time              Continued Care and Services - Discharged  on 1/23/2023 Admission date: 1/22/2023 - Discharge disposition: Home or Self Care   Coordination has not been started for this encounter.       Expected Discharge Date and Time     Expected Discharge Date Expected Discharge Time    Jan 23, 2023          Demographic Summary     Row Name 01/23/23 1410       General Information    Admission Type observation    Arrived From emergency department    Referral Source admission list    Reason for Consult discharge planning    Preferred Language English               Functional Status     Row Name 01/23/23 1410       Functional Status    Usual Activity Tolerance good    Current Activity Tolerance good       Functional Status, IADL    Medications independent    Meal Preparation independent    Housekeeping independent    Laundry independent    Shopping independent       Mental Status    General Appearance WDL WDL       Mental Status Summary    Recent Changes in Mental Status/Cognitive Functioning no changes                  Italia Licona RN

## 2024-01-31 ENCOUNTER — OFFICE VISIT (OUTPATIENT)
Dept: INTERNAL MEDICINE | Facility: CLINIC | Age: 54
End: 2024-01-31
Payer: COMMERCIAL

## 2024-01-31 VITALS
TEMPERATURE: 98 F | OXYGEN SATURATION: 95 % | SYSTOLIC BLOOD PRESSURE: 138 MMHG | WEIGHT: 228.3 LBS | DIASTOLIC BLOOD PRESSURE: 84 MMHG | BODY MASS INDEX: 36.69 KG/M2 | HEART RATE: 75 BPM | HEIGHT: 66 IN

## 2024-01-31 DIAGNOSIS — I10 ESSENTIAL HYPERTENSION: Primary | ICD-10-CM

## 2024-01-31 DIAGNOSIS — R73.03 PREDIABETES: ICD-10-CM

## 2024-01-31 RX ORDER — LISINOPRIL 20 MG/1
20 TABLET ORAL DAILY
Qty: 90 TABLET | Refills: 0 | Status: SHIPPED | OUTPATIENT
Start: 2024-01-31

## 2024-01-31 NOTE — PROGRESS NOTES
Subjective   Shanon Bean is a 53 y.o. female.     History of Present Illness  Patient presents to establish care.  She recently started an MA program and they have been monitoring blood pressures there and hers have been running about 150-160/100 regularly every time they check it.  For the past 2 weeks.  Before that she had not checked her blood pressure in about 5 months.  She denies chest pain, shortness of breath, diaphoresis.  Patient has a history of prediabetes states that her last A1c was 6.2 without any medications.  Though she does state she has been checking her glucose levels and fasting it has been about 130 every day.      The following portions of the patient's history were reviewed and updated as appropriate: allergies, current medications, past family history, past medical history, past social history, past surgical history, and problem list.    Review of Systems   All other systems reviewed and are negative.      Objective   Physical Exam  Vitals and nursing note reviewed.   Constitutional:       Appearance: Normal appearance.   HENT:      Head: Normocephalic and atraumatic.      Right Ear: External ear normal.      Left Ear: External ear normal.      Nose: Nose normal.      Mouth/Throat:      Mouth: Mucous membranes are moist.      Pharynx: Oropharynx is clear. No oropharyngeal exudate or posterior oropharyngeal erythema.   Eyes:      Extraocular Movements: Extraocular movements intact.      Conjunctiva/sclera: Conjunctivae normal.      Pupils: Pupils are equal, round, and reactive to light.   Cardiovascular:      Rate and Rhythm: Normal rate and regular rhythm.      Pulses: Normal pulses.      Heart sounds: Normal heart sounds.   Pulmonary:      Effort: Pulmonary effort is normal.      Breath sounds: Normal breath sounds.   Abdominal:      General: Abdomen is flat. Bowel sounds are normal.      Palpations: Abdomen is soft.   Musculoskeletal:         General: Normal range of motion.      Cervical  back: Normal range of motion.   Skin:     General: Skin is warm.      Capillary Refill: Capillary refill takes less than 2 seconds.   Neurological:      General: No focal deficit present.      Mental Status: She is alert and oriented to person, place, and time. Mental status is at baseline.   Psychiatric:         Mood and Affect: Mood normal.         Behavior: Behavior normal.         Thought Content: Thought content normal.         Judgment: Judgment normal.         Assessment & Plan   Diagnoses and all orders for this visit:    1. Essential hypertension (Primary)  -     CBC (No Diff)  -     Comprehensive Metabolic Panel  -     Hemoglobin A1c  -     Hepatitis C Antibody  -     Lipid Panel  -     TSH  -     Vitamin B12  -     Vitamin D,25-Hydroxy  -     lisinopril (PRINIVIL,ZESTRIL) 20 MG tablet; Take 1 tablet by mouth Daily.  Dispense: 90 tablet; Refill: 0    2. Prediabetes       Start patient on lisinopril  Monitor blood pressure levels on the lisinopril.  If still running high after week can increase the dose.  Checking blood work  Class 2 Severe Obesity (BMI >=35 and <=39.9). Obesity-related health conditions include the following: hypertension. Obesity is unchanged. BMI is is above average; BMI management plan is completed. We discussed portion control and increasing exercise.  Patient has been erroneously marked as diabetic. Based on the available clinical information, she does not have diabetes and should therefore be excluded from diabetic health maintenance and quality measures for the remainder of the reporting period.

## 2024-02-01 DIAGNOSIS — E11.65 TYPE 2 DIABETES MELLITUS WITH HYPERGLYCEMIA, WITHOUT LONG-TERM CURRENT USE OF INSULIN: Primary | ICD-10-CM

## 2024-02-01 LAB
25(OH)D3+25(OH)D2 SERPL-MCNC: 62.8 NG/ML (ref 30–100)
ALBUMIN SERPL-MCNC: 4.4 G/DL (ref 3.5–5.2)
ALBUMIN/GLOB SERPL: 1.5 G/DL
ALP SERPL-CCNC: 87 U/L (ref 39–117)
ALT SERPL-CCNC: 96 U/L (ref 1–33)
AST SERPL-CCNC: 76 U/L (ref 1–32)
BILIRUB SERPL-MCNC: 0.3 MG/DL (ref 0–1.2)
BUN SERPL-MCNC: 11 MG/DL (ref 6–20)
BUN/CREAT SERPL: 13.6 (ref 7–25)
CALCIUM SERPL-MCNC: 9.7 MG/DL (ref 8.6–10.5)
CHLORIDE SERPL-SCNC: 106 MMOL/L (ref 98–107)
CHOLEST SERPL-MCNC: 210 MG/DL (ref 0–200)
CO2 SERPL-SCNC: 24.1 MMOL/L (ref 22–29)
CREAT SERPL-MCNC: 0.81 MG/DL (ref 0.57–1)
EGFRCR SERPLBLD CKD-EPI 2021: 85.9 ML/MIN/1.73
ERYTHROCYTE [DISTWIDTH] IN BLOOD BY AUTOMATED COUNT: 13.6 % (ref 12.3–15.4)
GLOBULIN SER CALC-MCNC: 3 GM/DL
GLUCOSE SERPL-MCNC: 108 MG/DL (ref 65–99)
HBA1C MFR BLD: 6.8 % (ref 4.8–5.6)
HCT VFR BLD AUTO: 46.2 % (ref 34–46.6)
HCV IGG SERPL QL IA: NON REACTIVE
HDLC SERPL-MCNC: 35 MG/DL (ref 40–60)
HGB BLD-MCNC: 15.6 G/DL (ref 12–15.9)
LDLC SERPL CALC-MCNC: 126 MG/DL (ref 0–100)
MCH RBC QN AUTO: 28.7 PG (ref 26.6–33)
MCHC RBC AUTO-ENTMCNC: 33.8 G/DL (ref 31.5–35.7)
MCV RBC AUTO: 85.1 FL (ref 79–97)
PLATELET # BLD AUTO: 178 10*3/MM3 (ref 140–450)
POTASSIUM SERPL-SCNC: 4.3 MMOL/L (ref 3.5–5.2)
PROT SERPL-MCNC: 7.4 G/DL (ref 6–8.5)
RBC # BLD AUTO: 5.43 10*6/MM3 (ref 3.77–5.28)
SODIUM SERPL-SCNC: 141 MMOL/L (ref 136–145)
TRIGL SERPL-MCNC: 275 MG/DL (ref 0–150)
TSH SERPL DL<=0.005 MIU/L-ACNC: 3.16 UIU/ML (ref 0.27–4.2)
VIT B12 SERPL-MCNC: 354 PG/ML (ref 211–946)
VLDLC SERPL CALC-MCNC: 49 MG/DL (ref 5–40)
WBC # BLD AUTO: 8.02 10*3/MM3 (ref 3.4–10.8)

## 2024-02-01 RX ORDER — METFORMIN HYDROCHLORIDE 500 MG/1
500 TABLET, EXTENDED RELEASE ORAL
Qty: 90 TABLET | Refills: 3 | Status: SHIPPED | OUTPATIENT
Start: 2024-02-01

## 2024-04-26 DIAGNOSIS — I10 ESSENTIAL HYPERTENSION: ICD-10-CM

## 2024-04-26 RX ORDER — LISINOPRIL 20 MG/1
20 TABLET ORAL DAILY
Qty: 90 TABLET | Refills: 3 | Status: SHIPPED | OUTPATIENT
Start: 2024-04-26

## 2024-04-30 ENCOUNTER — OFFICE VISIT (OUTPATIENT)
Age: 54
End: 2024-04-30
Payer: COMMERCIAL

## 2024-04-30 VITALS
BODY MASS INDEX: 35.68 KG/M2 | SYSTOLIC BLOOD PRESSURE: 122 MMHG | OXYGEN SATURATION: 95 % | TEMPERATURE: 97.8 F | WEIGHT: 222 LBS | DIASTOLIC BLOOD PRESSURE: 86 MMHG | HEIGHT: 66 IN | HEART RATE: 78 BPM

## 2024-04-30 DIAGNOSIS — F32.A DEPRESSION, UNSPECIFIED DEPRESSION TYPE: Primary | ICD-10-CM

## 2024-04-30 DIAGNOSIS — E11.65 TYPE 2 DIABETES MELLITUS WITH HYPERGLYCEMIA, WITHOUT LONG-TERM CURRENT USE OF INSULIN: ICD-10-CM

## 2024-04-30 LAB
EXPIRATION DATE: ABNORMAL
HBA1C MFR BLD: 6.1 % (ref 4.5–5.7)
Lab: ABNORMAL

## 2024-04-30 PROCEDURE — 83036 HEMOGLOBIN GLYCOSYLATED A1C: CPT | Performed by: FAMILY MEDICINE

## 2024-04-30 PROCEDURE — 99214 OFFICE O/P EST MOD 30 MIN: CPT | Performed by: FAMILY MEDICINE

## 2024-04-30 RX ORDER — BUPROPION HYDROCHLORIDE 100 MG/1
100 TABLET, EXTENDED RELEASE ORAL 2 TIMES DAILY
Qty: 60 TABLET | Refills: 3 | Status: SHIPPED | OUTPATIENT
Start: 2024-04-30

## 2024-04-30 NOTE — PROGRESS NOTES
New Patient Office Visit      Date: 2024  Patient Name: Shanon Bean  : 1970   MRN: [id number]     Chief Complaint:    Chief Complaint   Patient presents with    [facility]     A1C recheck, started metformin        History of Present Illness: Shanon Bean is a 54 y.o. female who is here today for  BP and A1C follow up. Denies any complaints such as HA, CP, Dyspnea.    Does state that she has been under a lot of stress.  States that she has been having problems with getting angry very easy, which are centered around issues that she is having at home.  Has been on LExapro in the past.              Subjective      Review of Systems:   Review of Systems   Constitutional:  Negative for appetite change and unexpected weight loss.   HENT:  Negative for trouble swallowing.    Eyes:  Negative for blurred vision and double vision.   Respiratory:  Negative for cough and shortness of breath.    Cardiovascular:  Negative for chest pain and leg swelling.   Gastrointestinal:  Negative for blood in stool.   Endocrine: Negative for cold intolerance, heat intolerance and polyuria.   Musculoskeletal:  Negative for joint swelling.   Skin:  Negative for color change and bruise.   Neurological:  Negative for numbness and memory problem.   Hematological:  Does not bruise/bleed easily.   Psychiatric/Behavioral:  Negative for suicidal ideas and depressed mood. The patient is not nervous/anxious.        Past Medical History:   Past Medical History:   Diagnosis Date    Alpha 1-antitrypsin PiMS phenotype     Anti-phospholipid syndrome     Arthritis     Blood clotting disorder     Hypertension 2024    never high before    Sarcoidosis        Past Surgical History:   Past Surgical History:   Procedure Laterality Date     SECTION      CHOLECYSTECTOMY      FOOT SURGERY      SINUS SURGERY         Family History:   Family History   Problem Relation Age of Onset    Cancer Father         Skin    Thyroid disease  "Mother     Other Mother         PSP       Social History:   Social History     Socioeconomic History    Marital status:    Tobacco Use    Smoking status: Former     Current packs/day: 0.00     Average packs/day: 0.5 packs/day for 24.5 years (12.3 ttl pk-yrs)     Types: Cigarettes     Start date:      Quit date: 7/10/2023     Years since quittin.8    Smokeless tobacco: Never   Vaping Use    Vaping status: Never Used   Substance and Sexual Activity    Alcohol use: Never    Drug use: Never    Sexual activity: Not Currently     Partners: Male     Birth control/protection: Tubal ligation       Medications:     Current Outpatient Medications:     lisinopril (PRINIVIL,ZESTRIL) 20 MG tablet, TAKE 1 TABLET BY MOUTH DAILY, Disp: 90 tablet, Rfl: 3    metFORMIN ER (GLUCOPHAGE-XR) 500 MG 24 hr tablet, Take 1 tablet by mouth Daily With Breakfast., Disp: 90 tablet, Rfl: 3    rivaroxaban (Xarelto) 20 MG tablet, Take 1 tablet by mouth Daily., Disp: 90 tablet, Rfl: 3    buPROPion SR (Wellbutrin SR) 100 MG 12 hr tablet, Take 1 tablet by mouth 2 (Two) Times a Day. Take 100 mg one time a day for 3 days.  If no side effects increase to 100 mg two times a day., Disp: 60 tablet, Rfl: 3    Tirzepatide (MOUNJARO) 2.5 MG/0.5ML solution pen-injector pen, Inject 0.5 mL under the skin into the appropriate area as directed 1 (One) Time Per Week., Disp: 0.5 mL, Rfl: 0    Allergies:   Allergies   Allergen Reactions    Penicillins Other (See Comments)     Joint swelling    Sulfa Antibiotics Rash       Objective     Physical Exam:  Vital Signs:   Vitals:    24 0821   BP: 122/86   Pulse: 78   Temp: 97.8 °F (36.6 °C)   SpO2: 95%   Weight: 101 kg (222 lb)   Height: 167.6 cm (65.98\")     Body mass index is 35.85 kg/m².      Physical Exam  Vitals and nursing note reviewed.   Constitutional:       Appearance: Normal appearance.   HENT:      Head: Normocephalic and atraumatic.   Eyes:      General: Lids are normal.      " Conjunctiva/sclera: Conjunctivae normal.   Cardiovascular:      Rate and Rhythm: Normal rate and regular rhythm.   Pulmonary:      Effort: Pulmonary effort is normal.      Breath sounds: Normal breath sounds and air entry.   Abdominal:      General: Abdomen is flat. Bowel sounds are normal.      Palpations: Abdomen is soft.   Musculoskeletal:      Cervical back: Full passive range of motion without pain and normal range of motion.   Neurological:      General: No focal deficit present.      Mental Status: She is alert and oriented to person, place, and time.   Psychiatric:         Attention and Perception: Attention normal.         Mood and Affect: Affect is tearful.         Behavior: Behavior normal. Behavior is cooperative.         POCT Results (if applicable):   Results for orders placed or performed in visit on 04/23/24   Hepatitis B Surface Antibody    Specimen: Blood   Result Value Ref Range    Hep B S Ab Reactive         Measures:       Smoking Cessation:   less than 3 minutes spent counseling states taht she only occasionally smokes but does want to stop completely.     Assessment / Plan      Assessment/Plan:   Diagnoses and all orders for this visit:    1. Depression, unspecified depression type (Primary)  -     buPROPion SR (Wellbutrin SR) 100 MG 12 hr tablet; Take 1 tablet by mouth 2 (Two) Times a Day. Take 100 mg one time a day for 3 days.  If no side effects increase to 100 mg two times a day.  Dispense: 60 tablet; Refill: 3  Did discuss with her about seeing a therapist as I feel this will greatly help her sxs.    2. Type 2 diabetes mellitus with hyperglycemia, without long-term current use of insulin  -     POC Glycosylated Hemoglobin (Hb A1C)  -     Tirzepatide (MOUNJARO) 2.5 MG/0.5ML solution pen-injector pen; Inject 0.5 mL under the skin into the appropriate area as directed 1 (One) Time Per Week.  Dispense: 0.5 mL; Refill: 0              Follow Up:   Return in about 3 months (around 7/30/2024) for  Annual physical.    Forrest Florez, DO   [facility]

## 2024-05-06 ENCOUNTER — PATIENT ROUNDING (BHMG ONLY) (OUTPATIENT)
Age: 54
End: 2024-05-06
Payer: COMMERCIAL

## 2024-05-22 ENCOUNTER — PATIENT MESSAGE (OUTPATIENT)
Age: 54
End: 2024-05-22
Payer: COMMERCIAL

## 2024-05-23 NOTE — TELEPHONE ENCOUNTER
[facility], Mounjaro PA.    Ref#: [id number]    Justin   Operative Report     Date of Procedure: 12/22/23      Pre operative Diagnosis:  1. Malpositioned IUD   2. Pelvic pain   3. Desire for contraception      Postoperative Diagnosis:   Same, including left ovarian cyst and torsion      Procedure: DIAGNOSTIC LAPAROSCOPIC REMOVAL OF INTRA UTERINE DEVICE,,INSERTION RADIOPAQUE ETONOGESTREL CONTRACEPTIVE IMPLANT NEXPLANON, And Left Ovarian Cystectomy      Surgeon:  MOJGAN Manning MD  Assistant: RK Caro, PGY3 (role opening, removal of tissue, closing)  Anesthesia: General    Findings:   Normal appearing uterus and fallopian tubes   Left ovarian cyst resulting in torsion x 1, filled with yellow/clear fluid   Normal appearing right ovary  Thick adhesions noted from anterior lower uterine segment to bladder   Normal appearing liver  IUD strings visible through os      Complications: none  EBL: 15cc  Fluids: 500cc  Urine Output: 100cc  Antibiotics: None  Grafts/ Implants:    Nexplanon: LOT [id number], Exp 05/30/2025, NDC 66207-223-18   Blood Products: none      Procedure:  The patient was taken to the operating room and placed on the operating table. General anesthesia was induced and patient was placed in the dorsal lithotomy position. The upper, inner left arm was prepped with chlorhexidine. A point approximately 8 cm from the medial epicondyle of the elbow was identified and the Nexplanon was then inserted just underneath the surface of the skin in the recommended fashion.  The insertion site was covered with an adhesive bandage and covered with a pressure bandage.     The patient was then appropriately draped in a sterile fashion.  A velasquez catheter was placed with return of clear yellow urine.  A weighted speculum was placed into the patient's vagina, the cervix was visualized with IUD strings in place. Sponge stick was inserted into the vagina.     The Veress needle was then used to enter the abdomen at the level of the umbilicus.  Position was confirmed with drop test and CO2 was then  used to obtain pneumoperitoneum. A 5mm incision was made in the umbilicus and a 5mm laparoscope, trocar, and port where then advanced into the abdomen under direct visualization. The patient was then put into Trendelenburg position. No areas of brick bleeding were appreciated on visualization of entire abdomen.  Attention was then brought down to the pelvis, findings as above. IUD was not noted to have any evidence of perforation through the uterus. Sponge stick was removed from vagina. Under direct visualization, IUD strings were grasped from the vagina with ring forceps and intact IUD was removed. The Courtney dilators were then used to progressively dilate the cervix and a Humey uterine manipulator was then introduced through the endocervical canal into the endometrial cavity the balloon was inflated.     Additional 5mm  incisions were made in RLQ and LLQ. Trocars and ports placed under direct visualization. The left ovary and cyst were grasped with atraumatic graspers and detorsion was done. The Harmonic device was utilized to incise cyst and suction was done to decompress cyst. Harmonic device was then used to disect and remove cyst. Irrigation was done and hemostasis was noted.     The incision sites were closed in a subcuticular fashion using 4-0 Vicryl for port sites. Dermabond was applied. Vaginal maniuplator was deflated and all instruments were removed from the vagina. Good hemostasis was noted.     Dr. Manning was scrubbed and present for entire procedure.     Manisha Caro MD   Obstetrics & Gynecology PGY-3

## 2024-06-20 DIAGNOSIS — E11.65 TYPE 2 DIABETES MELLITUS WITH HYPERGLYCEMIA, WITHOUT LONG-TERM CURRENT USE OF INSULIN: Primary | ICD-10-CM

## 2024-06-30 ENCOUNTER — PATIENT MESSAGE (OUTPATIENT)
Age: 54
End: 2024-06-30
Payer: COMMERCIAL

## 2024-07-01 NOTE — TELEPHONE ENCOUNTER
From: Shanon Bean  To: Forrest Florez  Sent: 6/30/2024 3:08 PM EDT  Subject: Xarelto refill    I got an email that my Xarelto 20 mg has no refills left. Will you please send this refill to the [facility] pharmacy.  Thank you

## 2024-08-01 ENCOUNTER — OFFICE VISIT (OUTPATIENT)
Age: 54
End: 2024-08-01
Payer: COMMERCIAL

## 2024-08-01 VITALS
OXYGEN SATURATION: 95 % | TEMPERATURE: 97.1 F | SYSTOLIC BLOOD PRESSURE: 110 MMHG | DIASTOLIC BLOOD PRESSURE: 78 MMHG | BODY MASS INDEX: 35.36 KG/M2 | HEART RATE: 76 BPM | WEIGHT: 220 LBS | HEIGHT: 66 IN

## 2024-08-01 DIAGNOSIS — E11.65 TYPE 2 DIABETES MELLITUS WITH HYPERGLYCEMIA, WITHOUT LONG-TERM CURRENT USE OF INSULIN: Primary | ICD-10-CM

## 2024-08-01 DIAGNOSIS — Z12.11 COLON CANCER SCREENING: ICD-10-CM

## 2024-08-01 LAB
EXPIRATION DATE: ABNORMAL
HBA1C MFR BLD: 6.2 % (ref 4.5–5.7)
Lab: ABNORMAL
POC CREATININE URINE: 10
POC MICROALBUMIN URINE: 10

## 2024-08-01 PROCEDURE — 83036 HEMOGLOBIN GLYCOSYLATED A1C: CPT | Performed by: FAMILY MEDICINE

## 2024-08-01 PROCEDURE — 99396 PREV VISIT EST AGE 40-64: CPT | Performed by: FAMILY MEDICINE

## 2024-08-01 PROCEDURE — 82044 UR ALBUMIN SEMIQUANTITATIVE: CPT | Performed by: FAMILY MEDICINE

## 2024-08-01 NOTE — PROGRESS NOTES
Male Physical Note      Date: 2024   Patient Name: Shanon Bean  : 1970   MRN: [id number]     Chief Complaint:    Chief Complaint   Patient presents with    Annual Exam     Physical          History of Present Illness: Shanon Bean is a 54 y.o. female who is here today for their annual health maintenance and physical.  States that she has been doing much better on the Wellbutrin.  Feels less irritable during the day.  No complaints at this time.  Does state that she has an eye exam tomorrow.      Subjective      Review of Systems:   Review of Systems   Constitutional:  Negative for appetite change and unexpected weight loss.   HENT:  Negative for trouble swallowing.    Eyes:  Negative for blurred vision and double vision.   Respiratory:  Negative for cough and shortness of breath.    Cardiovascular:  Negative for chest pain and leg swelling.   Gastrointestinal:  Negative for blood in stool.   Endocrine: Negative for cold intolerance, heat intolerance and polyuria.   Musculoskeletal:  Negative for joint swelling.   Skin:  Negative for color change and bruise.   Neurological:  Negative for numbness and memory problem.   Hematological:  Does not bruise/bleed easily.   Psychiatric/Behavioral:  Negative for suicidal ideas and depressed mood. The patient is not nervous/anxious.        Past Medical History, Social History, Family History and Care Team were all reviewed with patient and updated as appropriate.     Medications:     Current Outpatient Medications:     buPROPion SR (Wellbutrin SR) 100 MG 12 hr tablet, Take 1 tablet by mouth once daily for 3 days.  If no side effects increase to 1 tablet two times a day., Disp: 60 tablet, Rfl: 3    lisinopril (PRINIVIL,ZESTRIL) 20 MG tablet, Take 1 tablet by mouth Daily., Disp: 90 tablet, Rfl: 3    metFORMIN ER (GLUCOPHAGE-XR) 500 MG 24 hr tablet, Take 1 tablet by mouth Daily With Breakfast., Disp: 90 tablet, Rfl: 3    rivaroxaban (Xarelto) 20 MG tablet, Take 1  tablet by mouth Daily with food, Disp: 90 tablet, Rfl: 3    Allergies:   Allergies   Allergen Reactions    Penicillins Other (See Comments)     Joint swelling    Sulfa Antibiotics Rash       Immunizations:  Health Maintenance Summary            Overdue - COLORECTAL CANCER SCREENING (View Topic Details) Never done      01/31/2024  Postponed until 1/31/2024 by Joselin Greene MA (Pending event)              Overdue - DIABETIC EYE EXAM (Yearly) Never done      No completion, postpone, or frequency change history exists for this topic.              Overdue - ANNUAL PHYSICAL (Yearly) Never done      01/31/2024  Postponed until 2/1/2024 by Joselin Greene MA (Pending event)              Overdue - DIABETIC FOOT EXAM (Yearly) Never done      No completion, postpone, or frequency change history exists for this topic.              Overdue - PAP SMEAR (Every 3 Years) Never done      01/31/2024  Postponed until 3/24/2024 by Joselin Greene MA (Pending event)              Overdue - MAMMOGRAM (Every 2 Years) Overdue since 2/23/2024 02/23/2022  MAMMO SCREENING DIGITAL TOMOSYNTHESIS BILATERAL W CAD    11/18/2013  MAMMOGRAPHY SCREENING BILATERAL              Postponed - INFLUENZA VACCINE (Yearly - August to March) Postponed until 3/31/2025      08/01/2024  Postponed until 3/31/2025 by Sandra Hopkins MA (Product Unavailable)    12/26/2023  Imm Admin: Fluzone (or Fluarix & Flulaval for VFC) >6mos    10/01/2020  Imm Admin: Fluzone (or Fluarix & Flulaval for VFC) >6mos              Postponed - ZOSTER VACCINE (1 of 2) Postponed until 4/30/2025 04/30/2024  Postponed until 4/30/2025 by Sandra Hopkins MA (Product Unavailable)              Postponed - Pneumococcal Vaccine 0-64 (1 of 2 - PCV) Postponed until 8/4/2025 08/01/2024  Postponed until 8/4/2025 by Sandra Hopkins MA (Pending event - pt declined- wants to wait until next fall)    05/23/2024  Postponed until 7/31/2024 by Sandra Hopkins MA (Pending event)  "             BMI FOLLOWUP (Yearly) Next due on 1/31/2025 01/31/2024  SmartData: WORKFLOW - QUALITY MEASUREMENT - DOCUMENTED WEIGHT FOLLOW-UP PLAN    01/31/2024  SmartData: WORKFLOW - QUALITY MEASUREMENT - BMI FOLLOW UP CARE PLAN DOCUMENTED              HEMOGLOBIN A1C (Every 6 Months) Next due on 2/1/2025 08/01/2024  Hemoglobin A1C component of POC Glycosylated Hemoglobin (Hb A1C)    04/30/2024  Hemoglobin A1C component of POC Glycosylated Hemoglobin (Hb A1C)    01/31/2024  Hemoglobin A1C component of Hemoglobin A1c    01/22/2023  Hemoglobin A1C component of Hemoglobin A1c              URINE MICROALBUMIN (Yearly) Next due on 8/1/2025 08/01/2024  Microalbumin, Urine component of POCT microalbumin              TDAP/TD VACCINES (2 - Td or Tdap) Next due on 12/26/2033 12/26/2023  Imm Admin: Tdap              COVID-19 Vaccine (Series Information) Completed      12/26/2023  Imm Admin: COVID-19 F23 (PFIZER) 12YRS+ (COMIRNATY)    11/05/2021  Imm Admin: COVID-19 (PFIZER) Purple Cap Monovalent    04/06/2021  Imm Admin: COVID-19 (Immusoft)              HEPATITIS C SCREENING  Completed      01/31/2024  Postponed until 1/31/2024 by Joselin Greene MA (Pending event)    01/31/2024  Hep C Virus Ab component of Hepatitis C Antibody              Hepatitis B (Series Information) Completed      02/12/2024  Imm Admin: Hepatitis B 2 Dose Vaccine Heplisav-B    01/03/2024  Imm Admin: Hepatitis B 2 Dose Vaccine Heplisav-B                    No orders of the defined types were placed in this encounter.      Colorectal Screening:   ordered   Last Completed Colonoscopy       This patient has no relevant Health Maintenance data.            Hep C (Age 18-79 once):  done    A1c:   Hemoglobin A1C   Date Value Ref Range Status   08/01/2024 6.2 (A) 4.5 - 5.7 % Final   01/22/2023 6.60 (H) 4.80 - 5.60 % Final     Lipid panel: No results found for: \"LABLIPI\"    The 10-year ASCVD risk score (Ermelinda DK, et al., 2019) is: 6.5%    " "Values used to calculate the score:      Age: 54 years      Sex: Female      Is Non- : No      Diabetic: Yes      Tobacco smoker: No      Systolic Blood Pressure: 110 mmHg      Is BP treated: Yes      HDL Cholesterol: 35 mg/dL      Total Cholesterol: 210 mg/dL      Ophthalmologist: has an appointment  Dentist: recommended    Tobacco Use: Medium Risk (8/1/2024)    Patient History     Smoking Tobacco Use: Former     Smokeless Tobacco Use: Never     Passive Exposure: Not on file       Social History     Substance and Sexual Activity   Alcohol Use Never        Social History     Substance and Sexual Activity   Drug Use Never        Diet/Physical activity: continue to encourage physical activity    Sexual health: , contraception used    PHQ-2 Depression Screening  PHQ-9 Total Score: 1           Objective     Physical Exam:  Vital Signs:   Vitals:    08/01/24 0815   BP: 110/78   Pulse: 76   Temp: 97.1 °F (36.2 °C)   SpO2: 95%   Weight: 99.8 kg (220 lb)   Height: 167.6 cm (65.98\")     Body mass index is 35.53 kg/m².     Physical Exam  Vitals and nursing note reviewed.   Constitutional:       Appearance: Normal appearance.   HENT:      Head: Normocephalic and atraumatic.   Eyes:      General: Lids are normal.      Conjunctiva/sclera: Conjunctivae normal.   Cardiovascular:      Rate and Rhythm: Normal rate and regular rhythm.   Pulmonary:      Effort: Pulmonary effort is normal.      Breath sounds: Normal breath sounds and air entry.   Abdominal:      General: Abdomen is flat. Bowel sounds are normal.      Palpations: Abdomen is soft.   Musculoskeletal:      Cervical back: Full passive range of motion without pain and normal range of motion.   Neurological:      General: No focal deficit present.      Mental Status: She is alert and oriented to person, place, and time.   Psychiatric:         Attention and Perception: Attention normal.         Mood and Affect: Mood normal.         Behavior: Behavior " normal. Behavior is cooperative.           Diabetic foot exam:   Left: Filament test present   PulsesDorsalis pedis: present      Proprioception normal   Sharp/dull discrimination normal       Right: Filament test present   Pulses  doraslis pedis: present      Proprioception normal   Sharp/dull discrimination normal       POCT Results (if applicable):   Results for orders placed or performed in visit on 08/01/24   POCT microalbumin    Specimen: Urine   Result Value Ref Range    Microalbumin, Urine 10     Creatinine, Urine 10    POC Glycosylated Hemoglobin (Hb A1C)    Specimen: Blood   Result Value Ref Range    Hemoglobin A1C 6.2 (A) 4.5 - 5.7 %    Lot Number [id number]     Expiration Date 01/03/2026        Procedures    Assessment / Plan      Assessment/Plan:   Diagnoses and all orders for this visit:    1. Type 2 diabetes mellitus with hyperglycemia, without long-term current use of insulin (Primary)  -     POCT microalbumin  -     POC Glycosylated Hemoglobin (Hb A1C)    2. Colon cancer screening  -     Ambulatory Referral to Gastroenterology         Healthcare Maintenance:  Counseling provided based on age appropriate USPSTF guidelines.       Shanon Vikas voices understanding and acceptance of this advice and will call back with any further questions or concerns. AVS with preventive healthcare tips printed for patient.     Vaccine Counseling:      Follow Up:   Return in about 6 months (around 2/1/2025).    Forrest Florez,   [facility]

## 2024-08-19 ENCOUNTER — PATIENT MESSAGE (OUTPATIENT)
Age: 54
End: 2024-08-19
Payer: COMMERCIAL

## 2024-08-19 DIAGNOSIS — F32.A DEPRESSION, UNSPECIFIED DEPRESSION TYPE: ICD-10-CM

## 2024-08-19 RX ORDER — BUPROPION HYDROCHLORIDE 100 MG/1
100 TABLET, EXTENDED RELEASE ORAL 2 TIMES DAILY
Qty: 180 TABLET | Refills: 1 | Status: SHIPPED | OUTPATIENT
Start: 2024-08-19

## 2024-08-19 NOTE — TELEPHONE ENCOUNTER
From: Shanon Bean  To: Forrest Florez  Sent: 8/19/2024 11:22 AM EDT  Subject: Wellbutrin    The pharmacy sent me a message that there are no more refills left on my Wellbutrin, will you please reorder and I am taking it twice a day.  Thank you!

## 2024-09-04 ENCOUNTER — OFFICE VISIT (OUTPATIENT)
Dept: OBSTETRICS AND GYNECOLOGY | Facility: CLINIC | Age: 54
End: 2024-09-04
Payer: COMMERCIAL

## 2024-09-04 ENCOUNTER — PREP FOR SURGERY (OUTPATIENT)
Dept: OTHER | Facility: HOSPITAL | Age: 54
End: 2024-09-04
Payer: COMMERCIAL

## 2024-09-04 VITALS
SYSTOLIC BLOOD PRESSURE: 112 MMHG | DIASTOLIC BLOOD PRESSURE: 78 MMHG | HEIGHT: 66 IN | BODY MASS INDEX: 35.84 KG/M2 | WEIGHT: 223 LBS

## 2024-09-04 DIAGNOSIS — N84.1 CERVICAL POLYP: ICD-10-CM

## 2024-09-04 DIAGNOSIS — Z12.31 BREAST CANCER SCREENING BY MAMMOGRAM: ICD-10-CM

## 2024-09-04 DIAGNOSIS — Z01.419 WELL WOMAN EXAM: Primary | ICD-10-CM

## 2024-09-04 DIAGNOSIS — N84.1 CERVICAL POLYP: Primary | ICD-10-CM

## 2024-09-04 PROCEDURE — 99386 PREV VISIT NEW AGE 40-64: CPT | Performed by: STUDENT IN AN ORGANIZED HEALTH CARE EDUCATION/TRAINING PROGRAM

## 2024-09-04 RX ORDER — SODIUM CHLORIDE 0.9 % (FLUSH) 0.9 %
3 SYRINGE (ML) INJECTION EVERY 12 HOURS SCHEDULED
OUTPATIENT
Start: 2024-09-04

## 2024-09-04 RX ORDER — SODIUM CHLORIDE 9 MG/ML
40 INJECTION, SOLUTION INTRAVENOUS AS NEEDED
OUTPATIENT
Start: 2024-09-04

## 2024-09-04 RX ORDER — SODIUM CHLORIDE 0.9 % (FLUSH) 0.9 %
10 SYRINGE (ML) INJECTION AS NEEDED
OUTPATIENT
Start: 2024-09-04

## 2024-09-04 NOTE — PROGRESS NOTES
Annual Well Woman Visit    Subjective   Chief Complaint   Patient presents with    Gynecologic Exam     MMG is due. Patient has no concerns.      Shanon Bean is a 54 y.o.  presenting to be seen for an annual well woman visit. Denies any GYN concerns today. Denies pelvic pain or vaginal bleeding. She has not had a menstrual cycle for 4-5 years. She has never been on menopausal hormone therapy due to a history of APLS. She took Menoquil to help with vasomotor/ mood symptoms and reports she was able to stop this about two years ago without significant side effects. Denies urinary complaints including incontinence.    OB Hx:   OB History    Para Term  AB Living   1 1 1     1   SAB IAB Ectopic Molar Multiple Live Births             1      # Outcome Date GA Lbr Reza/2nd Weight Sex Type Anes PTL Lv   1 Term 10/19/06 38w0d  3062 g (6 lb 12 oz) M CS-LTranv   GEORGETTE      Contraception: s/p BTL, postmenopausal  Pap smear: ~5 years ago, denies h/o abnormal  Mammogram: 2022, BI-RADS  Colonoscopy: scheduled to see GI in November to discuss  DEXA Scan: N/A    Past Medical History:   Diagnosis Date    Alpha 1-antitrypsin PiMS phenotype     Anti-phospholipid syndrome     Arthritis     Blood clotting disorder     Deep vein thrombosis     Diabetes mellitus     Hypertension 2024    never high before    PONV (postoperative nausea and vomiting)     Sarcoidosis     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     Varicella      Past Surgical History:   Procedure Laterality Date     SECTION      CHOLECYSTECTOMY      COLONOSCOPY      FOOT SURGERY      HERNIA REPAIR      SINUS SURGERY      TUBAL ABDOMINAL LIGATION       Family History   Problem Relation Age of Onset    Squamous cell carcinoma Father         Skin    Thyroid disease Mother     Parkinsonism Mother         PSP    Breast cancer Paternal Grandmother      Social History     Tobacco Use    Smoking status: Former     Current  "packs/day: 0.00     Average packs/day: 0.5 packs/day for 24.5 years (12.3 ttl pk-yrs)     Types: Cigarettes     Start date: 1999     Quit date: 7/10/2023     Years since quittin.1    Smokeless tobacco: Never   Vaping Use    Vaping status: Never Used   Substance Use Topics    Alcohol use: Never    Drug use: Never     (Not in a hospital admission)    Penicillins and Sulfa antibiotics  Current Outpatient Medications on File Prior to Visit   Medication Sig Dispense Refill    buPROPion SR (Wellbutrin SR) 100 MG 12 hr tablet Take 1 tablet by mouth once daily for 3 days.  If no side effects increase to 1 tablet two times a day. 180 tablet 1    lisinopril (PRINIVIL,ZESTRIL) 20 MG tablet Take 1 tablet by mouth Daily. 90 tablet 3    metFORMIN ER (GLUCOPHAGE-XR) 500 MG 24 hr tablet Take 1 tablet by mouth Daily With Breakfast. 90 tablet 3    rivaroxaban (Xarelto) 20 MG tablet Take 1 tablet by mouth Daily with food 90 tablet 3     No current facility-administered medications on file prior to visit.     Social History    Tobacco Use      Smoking status: Former        Packs/day: 0.00        Years: 0.5 packs/day for 24.5 years (12.3 ttl pk-yrs)        Types: Cigarettes        Start date: 1999        Quit date: 7/10/2023        Years since quittin.1      Smokeless tobacco: Never    Review of Systems  Pertinent items are noted in HPI, all other systems were reviewed and negative     Objective   /78   Ht 167.6 cm (65.98\")   Wt 101 kg (223 lb)   BMI 36.01 kg/m²     Physical Exam:  General Appearance: alert, interactive, and cooperative  Breasts:   Examined in supine position  Symmetric without hard/ fixed masses or skin dimpling  Nipples normal without inversion, lesions or discharge  There are no palpable axillary nodes  Abdomen: no masses, soft, non-tender, no guarding and no rebound tenderness  Pelvis:  Pelvic: Clinical staff was present for exam  External genitalia:  normal appearance of the external " genitalia with mild atrophic changes  :  urethral meatus normal  Vagina:  normal pink mucosa without lesions  Cervix:  approximately 2 cm beefy red polyp extruding from cervical os, unable to completely see the base, no other lesions seen  Uterus:  non-tender to palpation  Adnexa:  normal bimanual exam of the adnexa, no masses/ tenderness     Assessment & Plan    Annual well woman exam with age appropriate screening      Diagnosis Plan   1. Well woman exam  LIQUID-BASED PAP SMEAR WITH HPV GENOTYPING REGARDLESS OF INTERPRETATION ([facility])      2. Breast cancer screening by mammogram  Mammo Screening Digital Tomosynthesis Bilateral With CAD      3. Cervical polyp           Medications ordered: None, discussed topical estrogen if desired for  syndrome of menopause    Procedures performed: None    - Mammogram: Ordered  - Pap screening guidelines reviewed; pap smear completed  - Dexa scan: not indicated    - Colonoscopy: encouraged to keep f/u with GI  - Healthy diet and exercise encouraged  - Contraception: N/A  - Screening: none  - Cervical polyp: discussed option for removal in the office vs under sedation in the OR. Given chronic AC (xarelto) and relatively large size of polyp with inability to completely visualize the base, will plan for HSC, D&C, possible myomectomy. Prep for case placed. Instructed to hold xarelto the day prior to and the day of her procedure.    Follow up for post-op assessment.    Blanche West MD  Obstetrics and Gynecology  [facility]

## 2024-09-05 ENCOUNTER — PRE-ADMISSION TESTING (OUTPATIENT)
Dept: PREADMISSION TESTING | Facility: HOSPITAL | Age: 54
End: 2024-09-05
Payer: COMMERCIAL

## 2024-09-05 VITALS — HEIGHT: 66 IN | BODY MASS INDEX: 35.81 KG/M2 | WEIGHT: 222.8 LBS

## 2024-09-05 DIAGNOSIS — N84.1 CERVICAL POLYP: ICD-10-CM

## 2024-09-05 LAB
ANION GAP SERPL CALCULATED.3IONS-SCNC: 11.4 MMOL/L (ref 5–15)
BACTERIA UR QL AUTO: ABNORMAL /HPF
BASOPHILS # BLD AUTO: 0.07 10*3/MM3 (ref 0–0.2)
BASOPHILS NFR BLD AUTO: 0.9 % (ref 0–1.5)
BILIRUB UR QL STRIP: NEGATIVE
BUN SERPL-MCNC: 13 MG/DL (ref 6–20)
BUN/CREAT SERPL: 15.7 (ref 7–25)
CALCIUM SPEC-SCNC: 9.6 MG/DL (ref 8.6–10.5)
CHLORIDE SERPL-SCNC: 106 MMOL/L (ref 98–107)
CLARITY UR: CLEAR
CO2 SERPL-SCNC: 21.6 MMOL/L (ref 22–29)
COLOR UR: YELLOW
CREAT SERPL-MCNC: 0.83 MG/DL (ref 0.57–1)
DEPRECATED RDW RBC AUTO: 46.4 FL (ref 37–54)
EGFRCR SERPLBLD CKD-EPI 2021: 82.9 ML/MIN/1.73
EOSINOPHIL # BLD AUTO: 0.35 10*3/MM3 (ref 0–0.4)
EOSINOPHIL NFR BLD AUTO: 4.7 % (ref 0.3–6.2)
ERYTHROCYTE [DISTWIDTH] IN BLOOD BY AUTOMATED COUNT: 14.4 % (ref 12.3–15.4)
GLUCOSE SERPL-MCNC: 118 MG/DL (ref 65–99)
GLUCOSE UR STRIP-MCNC: NEGATIVE MG/DL
HCT VFR BLD AUTO: 43.7 % (ref 34–46.6)
HGB BLD-MCNC: 14.5 G/DL (ref 12–15.9)
HGB UR QL STRIP.AUTO: NEGATIVE
HYALINE CASTS UR QL AUTO: ABNORMAL /LPF
IMM GRANULOCYTES # BLD AUTO: 0.04 10*3/MM3 (ref 0–0.05)
IMM GRANULOCYTES NFR BLD AUTO: 0.5 % (ref 0–0.5)
KETONES UR QL STRIP: NEGATIVE
LEUKOCYTE ESTERASE UR QL STRIP.AUTO: ABNORMAL
LYMPHOCYTES # BLD AUTO: 2.22 10*3/MM3 (ref 0.7–3.1)
LYMPHOCYTES NFR BLD AUTO: 29.6 % (ref 19.6–45.3)
MCH RBC QN AUTO: 29 PG (ref 26.6–33)
MCHC RBC AUTO-ENTMCNC: 33.2 G/DL (ref 31.5–35.7)
MCV RBC AUTO: 87.4 FL (ref 79–97)
MONOCYTES # BLD AUTO: 0.37 10*3/MM3 (ref 0.1–0.9)
MONOCYTES NFR BLD AUTO: 4.9 % (ref 5–12)
NEUTROPHILS NFR BLD AUTO: 4.44 10*3/MM3 (ref 1.7–7)
NEUTROPHILS NFR BLD AUTO: 59.4 % (ref 42.7–76)
NITRITE UR QL STRIP: NEGATIVE
NRBC BLD AUTO-RTO: 0 /100 WBC (ref 0–0.2)
PH UR STRIP.AUTO: 6 [PH] (ref 5–8)
PLATELET # BLD AUTO: 181 10*3/MM3 (ref 140–450)
PMV BLD AUTO: 10.8 FL (ref 6–12)
POTASSIUM SERPL-SCNC: 4.6 MMOL/L (ref 3.5–5.2)
PROT UR QL STRIP: NEGATIVE
RBC # BLD AUTO: 5 10*6/MM3 (ref 3.77–5.28)
RBC # UR STRIP: ABNORMAL /HPF
REF LAB TEST METHOD: ABNORMAL
SODIUM SERPL-SCNC: 139 MMOL/L (ref 136–145)
SP GR UR STRIP: 1.01 (ref 1–1.03)
SQUAMOUS #/AREA URNS HPF: ABNORMAL /HPF
UROBILINOGEN UR QL STRIP: ABNORMAL
WBC # UR STRIP: ABNORMAL /HPF
WBC NRBC COR # BLD AUTO: 7.49 10*3/MM3 (ref 3.4–10.8)

## 2024-09-05 PROCEDURE — 93010 ELECTROCARDIOGRAM REPORT: CPT | Performed by: INTERNAL MEDICINE

## 2024-09-05 PROCEDURE — 93005 ELECTROCARDIOGRAM TRACING: CPT

## 2024-09-05 PROCEDURE — 80048 BASIC METABOLIC PNL TOTAL CA: CPT

## 2024-09-05 PROCEDURE — 81001 URINALYSIS AUTO W/SCOPE: CPT

## 2024-09-05 PROCEDURE — 85025 COMPLETE CBC W/AUTO DIFF WBC: CPT

## 2024-09-05 PROCEDURE — 36415 COLL VENOUS BLD VENIPUNCTURE: CPT

## 2024-09-05 NOTE — DISCHARGE INSTRUCTIONS
Pre-Admission testing appointment completed today for patient's upcoming procedure here at [facility].    PAT PASS reviewed with patient and they verbalize understanding of the following:     Do not eat or drink anything after midnight the night before procedure unless otherwise instructed by physician/surgeon's office, this includes no gum, candy, mints, tobacco products or e-cigarettes.  Do not shave the area to be operated on at least 48 hours prior to procedure.  Do not wear makeup, lotion, hair products, or nail polish.  Do not wear any jewelry and remove all piercings.  Do not wear any adhesive if you wear dentures.  Do not wear contacts; bring in glasses if needed.  Only take medications on the morning of procedure as instructed by PAT nurse per anesthesia guidelines or as instructed by physician's office.  If you are on any blood thinners reach out to the physician/surgeon's office for instructions on when/if they will need to be stopped prior to procedure.  Bring in picture ID and insurance card, advanced directive copies if applicable, CPAP/BIPAP/Inhalers if indicated morning of procedure, leave any other valuables at home.  Ensure you have arranged for someone to drive you home the day of your procedure and someone to care for you at home afterwards. It is recommended that you do not drive, drink alcohol, or make any major legal decisions for at least 24 hours after your procedure is complete.  Chlorhexadine sponge along with instruction/information sheet given to patient. Readybath wipes given in lieu of CHG if patient has CHG allergy. Instructed patient to date, time, and initial the verification sheet once skin prep has been completed, and to return [facility] the day of the procedure.     Introduction to anesthesia video watched by patient during appointment and anesthesia FAQ tip sheet given to patient.  Instructions and information given to patient about parking, hospital entrance, and  registration location.

## 2024-09-05 NOTE — PAT
"Called anesthesia phone and spoke with Jason Yeboah CRNA.  Informed that pt is having an upcoming procedure on 9/10/24 with Dr. West.  Reviewed pt's PMH with CRNA along with preliminary EKG interpretation of \"normal sinus rhythm, septal infarct, age undetermined.\"  Also reviewed EKG done from 1/22/23 with CRNA.  Pt denies shortness of air or chest pain.  MET score greater than 4.  Jason stated to call anesthesia back after EKG has been read if it still meets criteria to do so.    "

## 2024-09-06 LAB
QT INTERVAL: 406 MS
QTC INTERVAL: 429 MS

## 2024-09-06 NOTE — PAT
Called anesthesia and spoke with Jason Yeboah CRNA.  Informed that EKG from 9/5/24 has been read by Dr. Herbert and reviewed interpretation.  Jason stated that pt may proceed as scheduled with D&C and nothing further is needed.

## 2024-09-09 ENCOUNTER — ANESTHESIA EVENT (OUTPATIENT)
Dept: PERIOP | Facility: HOSPITAL | Age: 54
End: 2024-09-09
Payer: COMMERCIAL

## 2024-09-10 ENCOUNTER — ANESTHESIA (OUTPATIENT)
Dept: PERIOP | Facility: HOSPITAL | Age: 54
End: 2024-09-10
Payer: COMMERCIAL

## 2024-09-10 ENCOUNTER — HOSPITAL ENCOUNTER (OUTPATIENT)
Facility: HOSPITAL | Age: 54
Setting detail: HOSPITAL OUTPATIENT SURGERY
Discharge: HOME | End: 2024-09-10
Attending: STUDENT IN AN ORGANIZED HEALTH CARE EDUCATION/TRAINING PROGRAM | Admitting: STUDENT IN AN ORGANIZED HEALTH CARE EDUCATION/TRAINING PROGRAM
Payer: COMMERCIAL

## 2024-09-10 VITALS
OXYGEN SATURATION: 95 % | RESPIRATION RATE: 16 BRPM | TEMPERATURE: 97.5 F | HEART RATE: 66 BPM | SYSTOLIC BLOOD PRESSURE: 115 MMHG | DIASTOLIC BLOOD PRESSURE: 66 MMHG

## 2024-09-10 DIAGNOSIS — N84.1 CERVICAL POLYP: ICD-10-CM

## 2024-09-10 LAB — GLUCOSE BLDC GLUCOMTR-MCNC: 109 MG/DL (ref 70–130)

## 2024-09-10 PROCEDURE — 25010000002 DEXAMETHASONE PER 1 MG: Performed by: NURSE ANESTHETIST, CERTIFIED REGISTERED

## 2024-09-10 PROCEDURE — 25810000003 LACTATED RINGERS PER 1000 ML: Performed by: STUDENT IN AN ORGANIZED HEALTH CARE EDUCATION/TRAINING PROGRAM

## 2024-09-10 PROCEDURE — 25010000002 ONDANSETRON PER 1 MG: Performed by: NURSE ANESTHETIST, CERTIFIED REGISTERED

## 2024-09-10 PROCEDURE — 58558 HYSTEROSCOPY BIOPSY: CPT | Performed by: STUDENT IN AN ORGANIZED HEALTH CARE EDUCATION/TRAINING PROGRAM

## 2024-09-10 PROCEDURE — 25010000002 KETOROLAC TROMETHAMINE PER 15 MG: Performed by: NURSE ANESTHETIST, CERTIFIED REGISTERED

## 2024-09-10 PROCEDURE — 25010000002 PROPOFOL 200 MG/20ML EMULSION: Performed by: NURSE ANESTHETIST, CERTIFIED REGISTERED

## 2024-09-10 PROCEDURE — 82948 REAGENT STRIP/BLOOD GLUCOSE: CPT

## 2024-09-10 PROCEDURE — 25010000002 MIDAZOLAM PER 1MG: Performed by: NURSE ANESTHETIST, CERTIFIED REGISTERED

## 2024-09-10 PROCEDURE — 25010000002 FENTANYL CITRATE (PF) 50 MCG/ML SOLUTION PREFILLED SYRINGE: Performed by: NURSE ANESTHETIST, CERTIFIED REGISTERED

## 2024-09-10 PROCEDURE — 25810000003 SODIUM CHLORIDE 0.9 % SOLUTION: Performed by: STUDENT IN AN ORGANIZED HEALTH CARE EDUCATION/TRAINING PROGRAM

## 2024-09-10 PROCEDURE — 25010000002 PROPOFOL 10 MG/ML EMULSION: Performed by: NURSE ANESTHETIST, CERTIFIED REGISTERED

## 2024-09-10 PROCEDURE — S0260 H&P FOR SURGERY: HCPCS | Performed by: STUDENT IN AN ORGANIZED HEALTH CARE EDUCATION/TRAINING PROGRAM

## 2024-09-10 RX ORDER — SODIUM CHLORIDE 0.9 % (FLUSH) 0.9 %
10 SYRINGE (ML) INJECTION AS NEEDED
Status: DISCONTINUED | OUTPATIENT
Start: 2024-09-10 | End: 2024-09-10 | Stop reason: HOSPADM

## 2024-09-10 RX ORDER — LIDOCAINE HCL/PF 100 MG/5ML
SYRINGE (ML) INJECTION AS NEEDED
Status: DISCONTINUED | OUTPATIENT
Start: 2024-09-10 | End: 2024-09-10 | Stop reason: SURG

## 2024-09-10 RX ORDER — FENTANYL CITRATE 50 UG/ML
INJECTION, SOLUTION INTRAMUSCULAR; INTRAVENOUS AS NEEDED
Status: DISCONTINUED | OUTPATIENT
Start: 2024-09-10 | End: 2024-09-10 | Stop reason: SURG

## 2024-09-10 RX ORDER — PROPOFOL 10 MG/ML
INJECTION, EMULSION INTRAVENOUS AS NEEDED
Status: DISCONTINUED | OUTPATIENT
Start: 2024-09-10 | End: 2024-09-10 | Stop reason: SURG

## 2024-09-10 RX ORDER — MIDAZOLAM HYDROCHLORIDE 2 MG/2ML
INJECTION, SOLUTION INTRAMUSCULAR; INTRAVENOUS AS NEEDED
Status: DISCONTINUED | OUTPATIENT
Start: 2024-09-10 | End: 2024-09-10 | Stop reason: SURG

## 2024-09-10 RX ORDER — KETOROLAC TROMETHAMINE 30 MG/ML
INJECTION, SOLUTION INTRAMUSCULAR; INTRAVENOUS AS NEEDED
Status: DISCONTINUED | OUTPATIENT
Start: 2024-09-10 | End: 2024-09-10 | Stop reason: SURG

## 2024-09-10 RX ORDER — ACETAMINOPHEN 500 MG
1000 TABLET ORAL ONCE
Status: COMPLETED | OUTPATIENT
Start: 2024-09-10 | End: 2024-09-10

## 2024-09-10 RX ORDER — SODIUM CHLORIDE 0.9 % (FLUSH) 0.9 %
3 SYRINGE (ML) INJECTION EVERY 12 HOURS SCHEDULED
Status: DISCONTINUED | OUTPATIENT
Start: 2024-09-10 | End: 2024-09-10 | Stop reason: HOSPADM

## 2024-09-10 RX ORDER — ONDANSETRON 2 MG/ML
INJECTION INTRAMUSCULAR; INTRAVENOUS AS NEEDED
Status: DISCONTINUED | OUTPATIENT
Start: 2024-09-10 | End: 2024-09-10 | Stop reason: SURG

## 2024-09-10 RX ORDER — SODIUM CHLORIDE, SODIUM LACTATE, POTASSIUM CHLORIDE, CALCIUM CHLORIDE 600; 310; 30; 20 MG/100ML; MG/100ML; MG/100ML; MG/100ML
1000 INJECTION, SOLUTION INTRAVENOUS CONTINUOUS
Status: DISCONTINUED | OUTPATIENT
Start: 2024-09-10 | End: 2024-09-10 | Stop reason: HOSPADM

## 2024-09-10 RX ORDER — SODIUM CHLORIDE 9 MG/ML
40 INJECTION, SOLUTION INTRAVENOUS AS NEEDED
Status: DISCONTINUED | OUTPATIENT
Start: 2024-09-10 | End: 2024-09-10 | Stop reason: HOSPADM

## 2024-09-10 RX ORDER — SODIUM CHLORIDE 9 MG/ML
INJECTION, SOLUTION INTRAVENOUS CONTINUOUS PRN
Status: COMPLETED | OUTPATIENT
Start: 2024-09-10 | End: 2024-09-10

## 2024-09-10 RX ORDER — DEXAMETHASONE SODIUM PHOSPHATE 4 MG/ML
INJECTION, SOLUTION INTRA-ARTICULAR; INTRALESIONAL; INTRAMUSCULAR; INTRAVENOUS; SOFT TISSUE AS NEEDED
Status: DISCONTINUED | OUTPATIENT
Start: 2024-09-10 | End: 2024-09-10 | Stop reason: SURG

## 2024-09-10 RX ADMIN — DEXAMETHASONE SODIUM PHOSPHATE 4 MG: 4 INJECTION, SOLUTION INTRA-ARTICULAR; INTRALESIONAL; INTRAMUSCULAR; INTRAVENOUS; SOFT TISSUE at 14:07

## 2024-09-10 RX ADMIN — MIDAZOLAM HYDROCHLORIDE 2 MG: 1 INJECTION, SOLUTION INTRAMUSCULAR; INTRAVENOUS at 14:05

## 2024-09-10 RX ADMIN — ONDANSETRON 4 MG: 2 INJECTION INTRAMUSCULAR; INTRAVENOUS at 14:07

## 2024-09-10 RX ADMIN — SODIUM CHLORIDE, POTASSIUM CHLORIDE, SODIUM LACTATE AND CALCIUM CHLORIDE 1000 ML: 600; 310; 30; 20 INJECTION, SOLUTION INTRAVENOUS at 12:07

## 2024-09-10 RX ADMIN — Medication 40 MG: at 14:07

## 2024-09-10 RX ADMIN — PROPOFOL 140 MCG/KG/MIN: 10 INJECTION, EMULSION INTRAVENOUS at 14:07

## 2024-09-10 RX ADMIN — PROPOFOL 100 MG: 10 INJECTION, EMULSION INTRAVENOUS at 14:07

## 2024-09-10 RX ADMIN — ACETAMINOPHEN 1000 MG: 500 TABLET, FILM COATED ORAL at 12:23

## 2024-09-10 RX ADMIN — FENTANYL CITRATE 50 MCG: 50 INJECTION, SOLUTION INTRAMUSCULAR; INTRAVENOUS at 14:05

## 2024-09-10 RX ADMIN — KETOROLAC TROMETHAMINE 15 MG: 30 INJECTION, SOLUTION INTRAMUSCULAR; INTRAVENOUS at 14:36

## 2024-09-10 NOTE — ANESTHESIA POSTPROCEDURE EVALUATION
Patient: Shanon Bean    Procedure Summary       Date: 09/10/24 Room / Location: [facility] OR 1 /  [facility]    Anesthesia Start: 1405 Anesthesia Stop: 1440    Procedure: DILATATION AND CURETTAGE HYSTEROSCOPY WITH POSSIBLE MYOSURE and all other indicated procedures (Vagina) Diagnosis:       Cervical polyp      (Cervical polyp [N84.1])    Surgeons: Blanche West MD Provider: Tristen Live CRNA    Anesthesia Type: MAC ASA Status: 3            Anesthesia Type: MAC    Vitals  HR 66  Sat 97  /62  Resp 12  Temp 98        Post Anesthesia Care and Evaluation    Patient location during evaluation: bedside  Patient participation: complete - patient participated  Level of consciousness: awake and alert  Pain score: 0  Pain management: adequate    Airway patency: patent  Anesthetic complications: No anesthetic complications  PONV Status: none  Cardiovascular status: acceptable  Respiratory status: acceptable  Hydration status: acceptable

## 2024-09-10 NOTE — OP NOTE
Rigo Bean  : 1970  MRN: [id number]  CSN: [id number]  Date: 9/10/2024    Operative Report    Pre-op Diagnosis:  Cervical polyp [N84.1]    Post-op Diagnosis:  Same    Procedure: Procedure(s): Polypectomy,  Diagnostic hysteroscopy   Surgeon: Blanche West MD    Anesthesia Staff: CRNA: Raad Castillo CRNA    OR Staff: Circulator: Lakshmi Tapia RN  Scrub Person: Rosemary Alejandre   Anesthesia: Sedation   Antibiotics: None   Specimens:  Cervical polyp   Estimated Blood Loss: 2 ml   Complications: None           Findings: Normal external genitalia and vaginal vault. Normal-appearing cervix with a 2 cm polyp on a thin stalk. Cervical canal was normal in appearance. Anteverted uterus that sounded to 8 cm. Atrophic endometrium. Both tubal ostia were visualized.     Description of Procedure:   Following confirmation of informed consent, the patient was taken to the operating room where her anesthesia was obtained without difficulty. She was prepped and draped in the usual sterile fashion in the dorsal lithotomy position. A surgical timeout for safety was performed and agreed upon by all team members. A heavy-weighted speculum and Riley retractor were placed into her vagina and the cervix was visualized. A single tooth tenaculum was used to grasp the anterior lip of the cervix. A paracervical block was performed using 1% lidocaine with epinephrine. A ringed forceps was used to grasp and remove the cervical polyp and stalk. Gentle traction was applied to the tenaculum and the uterus was sounded to 8 cm using a uterine sound. The sound was removed and the cervix was gently dilated with sterile dilators to allow for entrance of a diagnostic hysteroscope. The diagnostic hysteroscope was then gently advanced to the uterine fundus and the above findings were noted. Both ostia were visualized. The hysteroscope was then removed. The polyp was sent to pathology for review. At the end of the  procedure, excellent hemostasis was noted and all instruments were removed from the vagina. All counts were correct per the OR staff. The patient was awakened and taken to the recovery room in stable condition.    Blanche West MD   Obstetrics and Gynecology

## 2024-09-10 NOTE — DISCHARGE INSTRUCTIONS
No pushing, pulling, tugging,  heavy lifting, or strenuous activity.  No major decision making, driving, or drinking alcoholic beverages for 24 hours. ( due to the medications you have  received)  Always use good hand hygiene/washing techniques.  NO driving while taking pain medications.    * if you have an incision:  Check your incision area every day for signs of infection.   Check for:  * more redness, swelling, or pain  *more fluid or blood  *warmth  *pus or bad smellPelvic rest is best described as not putting anything in your vagina. This includes tampons, douching, tub bathing or sexual activity.

## 2024-09-10 NOTE — H&P
GYNECOLOGY HISTORY AND PHYSICAL    CHIEF COMPLAINT: Scheduled surgery    DIAGNOSIS:  Cervical polyp  Chronic anticoagulation use  Antiphospholipid antibody syndrome    ASSESSMENT/PLAN     54 y.o.  female who presents for scheduled hysteroscopy, D&C and polypectomy.    - Proceed to the OR for scheduled surgery  - Risks for the procedure reviewed  - SCDs for DVT ppx  - Antibiotics: none indicated    HISTORY OF PRESENT ILLNESS     54 y.o.  female who presents for the scheduled procedure listed above. She recently presented to clinic for an annual exam and on speculum exam was found to have a ~2 cm polyp with a long stalk and inability to see the full extent of the stalk on exam. Because she is on chronic anticoagulation, removal in the clinic was deferred and plan was made to perform her polypectomy in the OR so that additional resources are available to control any excess bleeding should it occur. Additionally, we will have the ability to confirm that the entire polyp stalk is removed by completing a hysteroscopy.    She has no complaints today and there are no interval changes to the history.    REVIEW OF SYSTEMS: A complete review of systems was performed and was specifically negative for headache, changes in vision, RUQ pain, shortness of breath, chest pain, lower extremity edema and dysuria.     HISTORY:  Obstetrical History:  OB History    Para Term  AB Living   1 1 1     1   SAB IAB Ectopic Molar Multiple Live Births             1      # Outcome Date GA Lbr Reza/2nd Weight Sex Type Anes PTL Lv   1 Term 10/19/06 [id number]  3062 g (6 lb 12 oz) M CS-LTranv   GEORGETTE     Past Medical History:  Past Medical History:   Diagnosis Date    Alpha 1-antitrypsin PiMS phenotype     Anti-phospholipid syndrome     Anxiety     Arthritis     Blood clotting disorder     Borderline diabetes     Deep vein thrombosis     x2:  behind left knee, calf (unsure of laterality)    H/O exercise stress test      Hypertension 2024    never high before    PONV (postoperative nausea and vomiting)     Sarcoidosis     Sleep apnea     CPAP    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     Varicella      Past Surgical History:  Past Surgical History:   Procedure Laterality Date     SECTION      x1    CHOLECYSTECTOMY      COLONOSCOPY      FOOT SURGERY Left     HERNIA REPAIR      SINUS SURGERY      SKIN BIOPSY      TUBAL ABDOMINAL LIGATION      WISDOM TOOTH EXTRACTION       Social History:  Social History     Tobacco Use    Smoking status: Former     Current packs/day: 0.00     Average packs/day: 0.5 packs/day for 24.5 years (12.3 ttl pk-yrs)     Types: Cigarettes     Start date: 1999     Quit date: 7/10/2023     Years since quittin.1    Smokeless tobacco: Never   Vaping Use    Vaping status: Never Used   Substance Use Topics    Alcohol use: Never    Drug use: Yes     Comment: rarely     Family History  Family History   Problem Relation Age of Onset    Squamous cell carcinoma Father         Skin    Thyroid disease Mother     Parkinsonism Mother         PSP    Breast cancer Paternal Grandmother       Allergies:   Allergies   Allergen Reactions    Penicillins Other (See Comments)     Joint swelling    Sulfa Antibiotics Rash     OBJECTIVE   VITALS:  Temp:  [97.4 °F (36.3 °C)] 97.4 °F (36.3 °C)  Heart Rate:  [70] 70  Resp:  [14] 14  BP: (136)/(78) 136/78     PHYSICAL EXAM:  GENERAL: NAD, alert  CHEST: No increased work of breathing  CV: normal rate, WWP  ABDOMEN: Soft, NTTP  EXTREMITIES:  Warm and well-perfused, nontender, nonedematous  NEURO: AAO x 3, CN II-XII grossly intact    No current facility-administered medications on file prior to encounter.     Current Outpatient Medications on File Prior to Encounter   Medication Sig Dispense Refill    buPROPion SR (Wellbutrin SR) 100 MG 12 hr tablet Take 1 tablet by mouth once daily for 3 days.  If no side effects increase to 1 tablet two times a  day. 180 tablet 1    lisinopril (PRINIVIL,ZESTRIL) 20 MG tablet Take 1 tablet by mouth Daily. 90 tablet 3    metFORMIN ER (GLUCOPHAGE-XR) 500 MG 24 hr tablet Take 1 tablet by mouth Daily With Breakfast. 90 tablet 3    rivaroxaban (Xarelto) 20 MG tablet Take 1 tablet by mouth Daily with food 90 tablet 3     DIAGNOSTIC STUDIES:  Results from last 7 days   Lab Units 09/05/24  0821   WBC 10*3/mm3 7.49   HEMOGLOBIN g/dL 14.5   HEMATOCRIT % 43.7   PLATELETS 10*3/mm3 181   SODIUM mmol/L 139   POTASSIUM mmol/L 4.6   CHLORIDE mmol/L 106   CO2 mmol/L 21.6*   BUN mg/dL 13   CREATININE mg/dL 0.83   GLUCOSE mg/dL 118*   CALCIUM mg/dL 9.6     Recent Results (from the past 24 hour(s))   POC Glucose Once    Collection Time: 09/10/24 11:51 AM    Specimen: Blood   Result Value Ref Range    Glucose 109 70 - 130 mg/dL       Blanche West MD   Obstetrics and Gynecology  [facility]

## 2024-09-10 NOTE — ANESTHESIA PREPROCEDURE EVALUATION
Anesthesia Evaluation     Patient summary reviewed and Nursing notes reviewed   history of anesthetic complications:  PONV  NPO Solid Status: > 8 hours  NPO Liquid Status: > 8 hours           Airway   Mallampati: II  TM distance: >3 FB  Neck ROM: full  Possible difficult intubation  Dental - normal exam     Pulmonary - normal exam   (+) a smoker (quit in 2023) Former,sleep apnea on CPAP  Cardiovascular - normal exam  Exercise tolerance: good (4-7 METS)    ECG reviewed  PT is on anticoagulation therapy    (+) hypertension well controlled less than 2 medications, DVT resolved    ROS comment: EKG: normal sinus rhythm, septal infarct, age undetermined    Neuro/Psych  (+) dizziness/light headedness, numbness (paresthesia), psychiatric history Depression  GI/Hepatic/Renal/Endo    (+) diabetes mellitus type 2 well controlled    Musculoskeletal     Abdominal    Substance History   (+) drug use     OB/GYN          Other   arthritis,     ROS/Med Hx Other: Alpha 1-antitrypsin PiMS phenotype    Sarcoidosis    Anti-phospholipid syndrome    14.5/43.7  K 4.6                  Anesthesia Plan    ASA 3     MAC     (Risks and benefits discussed including risk of aspiration, recall and dental damage. All patient questions answered. Will continue with POC.)  intravenous induction     Anesthetic plan, risks, benefits, and alternatives have been provided, discussed and informed consent has been obtained with: patient.  Pre-procedure education provided    CODE STATUS:

## 2024-09-12 LAB
REF LAB TEST METHOD: NORMAL
REF LAB TEST METHOD: NORMAL

## 2024-11-08 RX ORDER — LOSARTAN POTASSIUM 50 MG/1
50 TABLET ORAL DAILY
Qty: 30 TABLET | Refills: 3 | Status: SHIPPED | OUTPATIENT
Start: 2024-11-08

## 2024-12-05 DIAGNOSIS — R60.0 MILD PERIPHERAL EDEMA: Primary | ICD-10-CM

## 2024-12-05 RX ORDER — HYDROCHLOROTHIAZIDE 12.5 MG/1
12.5 TABLET ORAL DAILY
Qty: 10 TABLET | Refills: 0 | Status: SHIPPED | OUTPATIENT
Start: 2024-12-05 | End: 2024-12-15

## 2024-12-12 ENCOUNTER — OFFICE VISIT (OUTPATIENT)
Dept: UROLOGY | Facility: CLINIC | Age: 54
End: 2024-12-12
Payer: COMMERCIAL

## 2024-12-12 VITALS
HEART RATE: 69 BPM | WEIGHT: 220 LBS | TEMPERATURE: 97.8 F | OXYGEN SATURATION: 98 % | SYSTOLIC BLOOD PRESSURE: 116 MMHG | HEIGHT: 66 IN | BODY MASS INDEX: 35.36 KG/M2 | DIASTOLIC BLOOD PRESSURE: 80 MMHG

## 2024-12-12 DIAGNOSIS — R23.2 HOT FLASHES: Primary | ICD-10-CM

## 2024-12-12 DIAGNOSIS — N95.8 GENITOURINARY SYNDROME OF MENOPAUSE: ICD-10-CM

## 2024-12-12 DIAGNOSIS — E55.9 VITAMIN D DEFICIENCY: Chronic | ICD-10-CM

## 2024-12-12 DIAGNOSIS — L65.9 HAIR LOSS: ICD-10-CM

## 2024-12-12 DIAGNOSIS — R53.82 CHRONIC FATIGUE: ICD-10-CM

## 2024-12-12 DIAGNOSIS — R63.5 WEIGHT GAIN: ICD-10-CM

## 2024-12-12 DIAGNOSIS — E78.5 HYPERLIPIDEMIA, UNSPECIFIED HYPERLIPIDEMIA TYPE: ICD-10-CM

## 2024-12-12 PROBLEM — D68.61 ANTIPHOSPHOLIPID ANTIBODY SYNDROME: Chronic | Status: ACTIVE | Noted: 2023-01-26

## 2024-12-12 PROBLEM — F41.9 ANXIETY: Status: ACTIVE | Noted: 2023-01-26

## 2024-12-12 PROBLEM — D86.9 SARCOIDOSIS: Chronic | Status: ACTIVE | Noted: 2023-01-26

## 2024-12-12 PROBLEM — I82.409 DVT (DEEP VENOUS THROMBOSIS): Chronic | Status: ACTIVE | Noted: 2024-12-12

## 2024-12-12 PROBLEM — Z86.69 HISTORY OF OBSTRUCTIVE SLEEP APNEA: Chronic | Status: ACTIVE | Noted: 2023-01-26

## 2024-12-12 PROBLEM — E11.9 CONTROLLED TYPE 2 DIABETES MELLITUS WITHOUT COMPLICATION, WITHOUT LONG-TERM CURRENT USE OF INSULIN: Chronic | Status: ACTIVE | Noted: 2023-01-26

## 2024-12-12 PROBLEM — E88.01 AAT (ALPHA-1-ANTITRYPSIN) DEFICIENCY: Chronic | Status: ACTIVE | Noted: 2023-01-26

## 2024-12-12 RX ORDER — ESTRADIOL 0.1 MG/G
1 CREAM VAGINAL 3 TIMES WEEKLY
Qty: 42.5 G | Refills: 12 | Status: SHIPPED | OUTPATIENT
Start: 2024-12-13

## 2024-12-12 NOTE — PROGRESS NOTES
Office Visit UTI Recurrent      Patient Name: Shanon Bean  : 1970   MRN: [id number]     Chief Complaint:   Chief Complaint   Patient presents with    Hormone imbalance       Referring Provider: No ref. provider found    History of Present Illness: Shanon Bean is a 54 y.o. female who presents today for concerns for hormonal imbalance.  Patient has medical history as noted below including anti-phospholipid syndrome on xarelto, controlled DM on metformin, history of gallbladder disease, HTN, HLD, vitamin D deficiency, JACK compliant with CPAP, and depression/anxiety.  Has been released from hematology.      Symptoms and concerns reviewed with patient:  Reports hot flashes  Denies night sweats  Reports vaginal dryness  Reports decreased libido  Denies inability to or delayed orgasm  Denies painful intercourse  Denies urinary incontinence  Denies frequent UTI  Denies breast tenderness  Reports weight gain  Reports hair loss  Reports hair thinning  Reports thinning eyebrows  Denies cold hands or feet  Denies brittle nails  Reports dry or flaking skin  Reports lack of energy/fatigue  Reports decreased muscle mass  Denies acne  Reports facial hair  Reports dry eyes  Reports joint pain  Denies difficulty sleeping  Denies mind racing at bedtime        Subjective      Review of System:   As noted in HPI      Past Medical History:   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     Varicella        Past Surgical History:   Past Surgical History:   Procedure  Laterality Date     SECTION      x1    CHOLECYSTECTOMY      COLONOSCOPY      D & C HYSTEROSCOPY MYOSURE N/A 9/10/2024    Procedure: DIAGNOSTIC HYSTEROSCOPY WITH POLYPECTOMY;  Surgeon: Blanche West MD;  Location: [facility];  Service: Obstetrics/Gynecology;  Laterality: N/A;    FOOT SURGERY Left     HERNIA REPAIR      SINUS SURGERY      SKIN BIOPSY      TUBAL ABDOMINAL LIGATION      WISDOM TOOTH EXTRACTION         Family History:   Family History   Problem Relation Age of Onset    Squamous cell carcinoma Father         Skin    Thyroid disease Mother     Parkinsonism Mother         PSP    Breast cancer Paternal Grandmother        Social History:   Social History     Socioeconomic History    Marital status:    Tobacco Use    Smoking status: Former     Current packs/day: 0.00     Average packs/day: 0.5 packs/day for 24.5 years (12.3 ttl pk-yrs)     Types: Cigarettes     Start date: 1999     Quit date: 7/10/2023     Years since quittin.4    Smokeless tobacco: Never   Vaping Use    Vaping status: Never Used   Substance and Sexual Activity    Alcohol use: Yes     Comment: Occasionally    Drug use: Yes     Comment: rarely    Sexual activity: Not Currently     Birth control/protection: Tubal ligation       Medications:     Current Outpatient Medications:     buPROPion SR (Wellbutrin SR) 100 MG 12 hr tablet, Take 1 tablet by mouth once daily for 3 days.  If no side effects increase to 1 tablet two times a day., Disp: 180 tablet, Rfl: 1    losartan (Cozaar) 50 MG tablet, Take 1 tablet by mouth Daily., Disp: 30 tablet, Rfl: 3    metFORMIN ER (GLUCOPHAGE-XR) 500 MG 24 hr tablet, Take 1 tablet by mouth Daily With Breakfast., Disp: 90 tablet, Rfl: 3    rivaroxaban (Xarelto) 20 MG tablet, Take 1 tablet by mouth Daily with food, Disp: 90 tablet, Rfl: 3    [START ON 2024] estradiol (ESTRACE) 0.1 MG/GM vaginal cream, Insert 1 g into the vagina 3 (Three) Times a Week. Apply periurethral and  "perivaginally 3 times a week at bedtime., Disp: 42.5 g, Rfl: 12    Allergies:   Allergies   Allergen Reactions    Penicillins Other (See Comments)     Joint swelling    Sulfa Antibiotics Rash       Objective     Physical Exam:   Vital Signs:   Vitals:    12/12/24 1349   BP: 116/80   Pulse: 69   Temp: 97.8 °F (36.6 °C)   SpO2: 98%   Weight: 99.8 kg (220 lb)   Height: 167.6 cm (66\")     Body mass index is 35.51 kg/m².     Physical Exam  Vitals and nursing note reviewed.   Constitutional:       General: She is awake. She is not in acute distress.     Appearance: She is overweight.   Pulmonary:      Effort: Pulmonary effort is normal.   Genitourinary:     Comments: Declined pelvic exam  Neurological:      Mental Status: She is alert and oriented to person, place, and time. Mental status is at baseline.   Psychiatric:         Mood and Affect: Mood normal.         Behavior: Behavior is cooperative.           Labs:   Brief Urine Lab Results  (Last result in the past 365 days)        Color   Clarity   Blood   Leuk Est   Nitrite   Protein   CREAT   Urine HCG        09/05/24 0821 Yellow   Clear   Negative   Trace   Negative   Negative                        WBC   Date Value Ref Range Status   09/05/2024 7.49 3.40 - 10.80 10*3/mm3 Final   01/31/2024 8.02 3.40 - 10.80 10*3/mm3 Final     RBC   Date Value Ref Range Status   09/05/2024 5.00 3.77 - 5.28 10*6/mm3 Final   01/31/2024 5.43 (H) 3.77 - 5.28 10*6/mm3 Final     Hemoglobin   Date Value Ref Range Status   09/05/2024 14.5 12.0 - 15.9 g/dL Final     Hematocrit   Date Value Ref Range Status   09/05/2024 43.7 34.0 - 46.6 % Final     MCV   Date Value Ref Range Status   09/05/2024 87.4 79.0 - 97.0 fL Final     MCH   Date Value Ref Range Status   09/05/2024 29.0 26.6 - 33.0 pg Final     MCHC   Date Value Ref Range Status   09/05/2024 33.2 31.5 - 35.7 g/dL Final     RDW   Date Value Ref Range Status   09/05/2024 14.4 12.3 - 15.4 % Final     RDW-SD   Date Value Ref Range Status "   09/05/2024 46.4 37.0 - 54.0 fl Final     MPV   Date Value Ref Range Status   09/05/2024 10.8 6.0 - 12.0 fL Final     Platelets   Date Value Ref Range Status   09/05/2024 181 140 - 450 10*3/mm3 Final     Neutrophil %   Date Value Ref Range Status   09/05/2024 59.4 42.7 - 76.0 % Final     Lymphocyte %   Date Value Ref Range Status   09/05/2024 29.6 19.6 - 45.3 % Final     Monocyte %   Date Value Ref Range Status   09/05/2024 4.9 (L) 5.0 - 12.0 % Final     Eosinophil %   Date Value Ref Range Status   09/05/2024 4.7 0.3 - 6.2 % Final     Basophil %   Date Value Ref Range Status   09/05/2024 0.9 0.0 - 1.5 % Final     Immature Grans %   Date Value Ref Range Status   09/05/2024 0.5 0.0 - 0.5 % Final     Neutrophils, Absolute   Date Value Ref Range Status   09/05/2024 4.44 1.70 - 7.00 10*3/mm3 Final     Lymphocytes, Absolute   Date Value Ref Range Status   09/05/2024 2.22 0.70 - 3.10 10*3/mm3 Final     Monocytes, Absolute   Date Value Ref Range Status   09/05/2024 0.37 0.10 - 0.90 10*3/mm3 Final     Eosinophils, Absolute   Date Value Ref Range Status   09/05/2024 0.35 0.00 - 0.40 10*3/mm3 Final     Basophils, Absolute   Date Value Ref Range Status   09/05/2024 0.07 0.00 - 0.20 10*3/mm3 Final     Immature Grans, Absolute   Date Value Ref Range Status   09/05/2024 0.04 0.00 - 0.05 10*3/mm3 Final     nRBC   Date Value Ref Range Status   09/05/2024 0.0 0.0 - 0.2 /100 WBC Final        Glucose   Date Value Ref Range Status   09/05/2024 118 (H) 65 - 99 mg/dL Final     Sodium   Date Value Ref Range Status   09/05/2024 139 136 - 145 mmol/L Final     Potassium   Date Value Ref Range Status   09/05/2024 4.6 3.5 - 5.2 mmol/L Final     CO2   Date Value Ref Range Status   09/05/2024 21.6 (L) 22.0 - 29.0 mmol/L Final     Chloride   Date Value Ref Range Status   09/05/2024 106 98 - 107 mmol/L Final     Anion Gap   Date Value Ref Range Status   09/05/2024 11.4 5.0 - 15.0 mmol/L Final     Creatinine   Date Value Ref Range Status    09/05/2024 0.83 0.57 - 1.00 mg/dL Final     BUN   Date Value Ref Range Status   09/05/2024 13 6 - 20 mg/dL Final     BUN/Creatinine Ratio   Date Value Ref Range Status   09/05/2024 15.7 7.0 - 25.0 Final     Calcium   Date Value Ref Range Status   09/05/2024 9.6 8.6 - 10.5 mg/dL Final     eGFR Non  Amer   Date Value Ref Range Status   10/31/2021 61 >60 mL/min/1.73 Final     Alkaline Phosphatase   Date Value Ref Range Status   01/31/2024 87 39 - 117 U/L Final   01/22/2023 104 39 - 117 U/L Final     Total Protein   Date Value Ref Range Status   01/22/2023 7.6 6.0 - 8.5 g/dL Final     ALT (SGPT)   Date Value Ref Range Status   01/31/2024 96 (H) 1 - 33 U/L Final   01/22/2023 55 (H) 1 - 33 U/L Final     AST (SGOT)   Date Value Ref Range Status   01/31/2024 76 (H) 1 - 32 U/L Final   01/22/2023 47 (H) 1 - 32 U/L Final     Total Bilirubin   Date Value Ref Range Status   01/31/2024 0.3 0.0 - 1.2 mg/dL Final   01/22/2023 0.3 0.0 - 1.2 mg/dL Final     Albumin   Date Value Ref Range Status   01/31/2024 4.4 3.5 - 5.2 g/dL Final   01/22/2023 4.3 3.5 - 5.2 g/dL Final     Globulin   Date Value Ref Range Status   01/22/2023 3.3 gm/dL Final     A/G Ratio   Date Value Ref Range Status   01/31/2024 1.5 g/dL Final          I have reviewed the above labs.      Assessment / Plan      Assessment:  Diagnoses and all orders for this visit:    1. Hot flashes (Primary)  -     Comprehensive Metabolic Panel; Future  -     Estradiol; Future  -     Testosterone (Free & Total), LC / MS; Future  -     CBC & Differential; Future    2. Vitamin D deficiency    3. Genitourinary syndrome of menopause  -     estradiol (ESTRACE) 0.1 MG/GM vaginal cream; Insert 1 g into the vagina 3 (Three) Times a Week. Apply periurethral and perivaginally 3 times a week at bedtime.  Dispense: 42.5 g; Refill: 12    4. Weight gain  -     T3, Free; Future  -     TSH+Free T4; Future  -     Testosterone (Free & Total), LC / MS; Future  -     Thyroid Peroxidase  Antibody; Future  -     Cortisol; Future  -     CBC & Differential; Future    5. Hair loss  -     T3, Free; Future  -     TSH+Free T4; Future  -     Thyroid Peroxidase Antibody; Future  -     CBC & Differential; Future    6. Chronic fatigue  -     Comprehensive Metabolic Panel; Future  -     Cortisol; Future  -     CBC & Differential; Future    7. Hyperlipidemia, unspecified hyperlipidemia type  -     Lipid Panel; Future         54 y.o. female presents with concern for hormonal imbalance.  Primary symptoms include: hot flashes, vaginal dryness, decreased libido, weight gain, hair loss, thinning, and thinning eyebrows, dry/flaking skin, fatigue, decreased muscle mass, facial hair, dry eyes, and joint pain.     Patient has medical history as noted including anti-phospholipid syndrome on xarelto, controlled DM on metformin, history of gallbladder disease, HTN, HLD, vitamin D deficiency, JACK compliant with CPAP, and depression/anxiety.  Has been released from hematology.  Patient supplemented on vitamin D.  B12 <500, recommend starting B12 1000 mcg SL daily.  Given age and symptoms recommend Estradiol 1 gram vaginal cream 0.1 mg/gm periurethral and perivaginal 3 times a week at bedtime.      Currently pregnant or trying to conceive-no   Date of last mammogram is scheduled for 01/20/25.    Date of last menstrual cycle 4 years ago.  History of endometrial ablation-no  Currently on birth control-no  History of hysterectomy-no  Currently utilizing BHRT or HRT-no  Currently on statins-no  Current smoker-no  Currently on oral nitrites-no  History of breast cancer-no  History of endometrial cancer-no    Educated that hormonal imbalance in women can occur as part of the aging process with olaf/post menopause and genetic predispositions.  Diet, exercise, and stress can also contribute to hormone imbalance.  Workup is customized to the patient's symptoms and history.  Results will determine if patient is eligible for hormone  replacement therapy with pellets.  If eligible, hormone options and dosing will be based upon your lab results.  Insurance coverage varies with lab coverage.  Insurance does not cover cost of pellets or insertion, price was discussed.  Patient will follow up in 3 weeks to discuss results and next steps.  Patient given [employer] hormone optimization brochure.      Plan:  Obtain CBC, CMP, estradiol, free T3, free T4, TT (LC-MS), TPO, cortisol, and lipid panel.  Follow up in 3 weeks to review labs and discuss treatment plan.   Start Estradiol 1 gram vaginal cream 0.1 mg/gm periurethral and perivaginal 3 times a week at bedtime   Start vitamin B12 1000 mcg SL daily      Follow Up:   Return in about 3 weeks (around 1/2/2025) for recheck with Chastity, labs prior.      JESSI Quiroga  [facility]

## 2024-12-18 ENCOUNTER — LAB (OUTPATIENT)
Dept: LAB | Facility: HOSPITAL | Age: 54
End: 2024-12-18
Payer: COMMERCIAL

## 2024-12-18 PROCEDURE — 84402 ASSAY OF FREE TESTOSTERONE: CPT | Performed by: NURSE PRACTITIONER

## 2024-12-18 PROCEDURE — 82670 ASSAY OF TOTAL ESTRADIOL: CPT | Performed by: NURSE PRACTITIONER

## 2024-12-18 PROCEDURE — 84439 ASSAY OF FREE THYROXINE: CPT | Performed by: NURSE PRACTITIONER

## 2024-12-18 PROCEDURE — 84403 ASSAY OF TOTAL TESTOSTERONE: CPT | Performed by: NURSE PRACTITIONER

## 2024-12-18 PROCEDURE — 80061 LIPID PANEL: CPT | Performed by: NURSE PRACTITIONER

## 2024-12-18 PROCEDURE — 86376 MICROSOMAL ANTIBODY EACH: CPT | Performed by: NURSE PRACTITIONER

## 2024-12-18 PROCEDURE — 84481 FREE ASSAY (FT-3): CPT | Performed by: NURSE PRACTITIONER

## 2024-12-18 PROCEDURE — 82533 TOTAL CORTISOL: CPT | Performed by: NURSE PRACTITIONER

## 2024-12-18 PROCEDURE — 80050 GENERAL HEALTH PANEL: CPT | Performed by: NURSE PRACTITIONER

## 2025-01-07 ENCOUNTER — HOSPITAL ENCOUNTER (OUTPATIENT)
Dept: MAMMOGRAPHY | Facility: HOSPITAL | Age: 55
Discharge: HOME | End: 2025-01-07
Admitting: STUDENT IN AN ORGANIZED HEALTH CARE EDUCATION/TRAINING PROGRAM
Payer: COMMERCIAL

## 2025-01-07 DIAGNOSIS — Z12.31 BREAST CANCER SCREENING BY MAMMOGRAM: ICD-10-CM

## 2025-01-07 PROCEDURE — 77063 BREAST TOMOSYNTHESIS BI: CPT

## 2025-01-07 PROCEDURE — 77067 SCR MAMMO BI INCL CAD: CPT

## 2025-01-10 ENCOUNTER — OFFICE VISIT (OUTPATIENT)
Dept: UROLOGY | Facility: CLINIC | Age: 55
End: 2025-01-10
Payer: COMMERCIAL

## 2025-01-10 VITALS
WEIGHT: 227 LBS | BODY MASS INDEX: 36.48 KG/M2 | TEMPERATURE: 97 F | DIASTOLIC BLOOD PRESSURE: 82 MMHG | SYSTOLIC BLOOD PRESSURE: 124 MMHG | HEART RATE: 84 BPM | HEIGHT: 66 IN | OXYGEN SATURATION: 96 %

## 2025-01-10 DIAGNOSIS — R63.5 WEIGHT GAIN: ICD-10-CM

## 2025-01-10 DIAGNOSIS — R23.2 HOT FLASHES: ICD-10-CM

## 2025-01-10 DIAGNOSIS — R53.82 CHRONIC FATIGUE: Primary | ICD-10-CM

## 2025-01-10 DIAGNOSIS — R74.8 ELEVATED LIVER ENZYMES: ICD-10-CM

## 2025-01-10 DIAGNOSIS — N95.8 GENITOURINARY SYNDROME OF MENOPAUSE: ICD-10-CM

## 2025-01-10 PROBLEM — Z78.0 POST-MENOPAUSE: Status: ACTIVE | Noted: 2025-01-10

## 2025-01-10 NOTE — PROGRESS NOTES
Office Visit     Patient Name: Shanon Bean  : 1970   MRN: [id number]     Chief Complaint: No chief complaint on file.    Referring Provider: No ref. provider found    Primary Care Provider: Forrest Florez DO     History of Present Illness: Shanon Bean is a 54 y.o. female presents with concern for hormonal imbalance.  Primary symptoms include; ***  Patient obtained labs     Subjective   Review of System:   As noted in HPI.    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    • Varicella      Past Surgical History:   Procedure Laterality Date   •  SECTION      x1   • CHOLECYSTECTOMY     • COLONOSCOPY     • D & C HYSTEROSCOPY MYOSURE N/A 9/10/2024    Procedure: DIAGNOSTIC HYSTEROSCOPY WITH POLYPECTOMY;  Surgeon: Blanche West MD;  Location: [facility];  Service: Obstetrics/Gynecology;  Laterality: N/A;   • FOOT SURGERY Left    • HERNIA REPAIR     • SINUS SURGERY     • SKIN BIOPSY     • TUBAL ABDOMINAL LIGATION     • WISDOM TOOTH EXTRACTION       Family History   Problem Relation Age of Onset   • Squamous cell carcinoma Father         Skin   • Thyroid disease Mother    • Parkinsonism Mother         PSP   • Breast cancer Paternal Grandmother      Social History     Socioeconomic History   • Marital status:    Tobacco Use   • Smoking status: Former     Current packs/day: 0.00     Average packs/day: 0.5 packs/day for 24.5 years (12.3 ttl pk-yrs)     Types: Cigarettes     Start date: 1999     Quit date:  "7/10/2023     Years since quittin.5     Passive exposure: Past   • Smokeless tobacco: Never   Vaping Use   • Vaping status: Never Used   Substance and Sexual Activity   • Alcohol use: Yes     Comment: Occasionally   • Drug use: Never   • Sexual activity: Not Currently     Birth control/protection: Tubal ligation       Current Outpatient Medications:   •  buPROPion SR (Wellbutrin SR) 100 MG 12 hr tablet, Take 1 tablet by mouth once daily for 3 days.  If no side effects increase to 1 tablet two times a day., Disp: 180 tablet, Rfl: 1  •  estradiol (ESTRACE) 0.1 MG/GM vaginal cream, Insert 1 g into the vagina 3 (Three) Times a Week. Apply periurethral and perivaginally 3 times a week at bedtime., Disp: 42.5 g, Rfl: 12  •  losartan (Cozaar) 50 MG tablet, Take 1 tablet by mouth Daily., Disp: 30 tablet, Rfl: 3  •  metFORMIN ER (GLUCOPHAGE-XR) 500 MG 24 hr tablet, Take 1 tablet by mouth Daily With Breakfast., Disp: 90 tablet, Rfl: 3  •  rivaroxaban (Xarelto) 20 MG tablet, Take 1 tablet by mouth Daily with food, Disp: 90 tablet, Rfl: 3    Allergies   Allergen Reactions   • Penicillins Other (See Comments)     Joint swelling   • Sulfa Antibiotics Rash     Objective   Visit Vitals  /82   Pulse 84   Temp 97 °F (36.1 °C)   Ht 167.6 cm (66\")   Wt 103 kg (227 lb)   SpO2 96%   BMI 36.64 kg/m²        Body mass index is 36.64 kg/m².     Physical Exam ***    Labs  Lab Results   Component Value Date    COLORU Yellow 2024    CLARITYU Clear 2024    PHUR 6.0 2024    LEUKOCYTESUR Trace (A) 2024    KETONESU Negative 2024    UROBILINOGEN 0.2 E.U./dL 2024    BILIRUBINUR Negative 2024      Lab Results   Component Value Date    WBCUA 3-5 (A) 2024    WBCUA 6-12 (A) 2023    RBCUA 0-2 2024    RBCUA None Seen 2023    BACTERIA Trace (A) 2024    BACTERIA Trace (A) 2023    HYALCASTU None Seen 2024    HYALCASTU None Seen 2023    SQUAMEPIUA 0-2 2024 " "   SQUAMEPIUA 0-2 01/22/2023        Lab Results   Component Value Date    WBC 7.33 12/18/2024    HGB 13.6 12/18/2024    HCT 42.6 12/18/2024    MCV 90.4 12/18/2024     12/18/2024     Lab Results   Component Value Date    GLUCOSE 148 (H) 12/18/2024    CALCIUM 9.7 12/18/2024     12/18/2024    K 4.2 12/18/2024    CO2 24.7 12/18/2024     12/18/2024    BUN 13 12/18/2024    BUN 13 09/05/2024    CREATININE 0.95 12/18/2024    CREATININE 0.83 09/05/2024    EGFR 70.5 12/18/2024    EGFR 82.9 09/05/2024    BCR 13.7 12/18/2024    ANIONGAP 12.3 12/18/2024     (H) 12/18/2024    AST 80 (H) 12/18/2024       Lab Results   Component Value Date    HGBA1C 6.2 (A) 08/01/2024       Lab Results   Component Value Date    TESTFRE 1.0 12/18/2024    ESTRADIOL 6.3 12/18/2024       No results found for: \"ATOPOBIUMV\", \"BVAB2\", \"MEGASPHAER\", \"CALBICANSN\", \"CGLABRATAN\", \"CPARAPSILOS\", \"CLUSITANIAE\", \"CKRUSEI\", \"TRICHVAG\", \"CHLAMNAA\", \"NGONORRHON\", \"UREAPLASMA\", \"MYCOPLASMAG\"    Lab Results   Component Value Date    TSH 3.700 12/18/2024    FREET4 1.02 12/18/2024    T3FREE 2.94 12/18/2024    [id number] 354 01/31/2024    GRYV67BA 62.8 01/31/2024    CORTISOL 15.00 12/18/2024    TESTOSTERONE 6.2 12/18/2024         Radiographic Studies  Mammo Screening Digital Tomosynthesis Bilateral With CAD    Result Date: 1/9/2025  No mammographic evidence of malignancy   BI-RADS CATEGORY: 2 , BENIGN FINDING(S).   RECOMMENDED FOLLOW-UP:  Annual screening mammography.    A letter including results and recommendation was sent to the patient. Density notification was provided to patients with type III or type IV breast tissue pattern.  Patient information was entered into a reminder system with a target due date for the next mammogram    NOTES: Mammography does not detect approximately 10-15% of breast cancers. Physical examination of the breasts by a physician and regular monthly breast self examinations are integral parts of breast cancer " screening.   A normal mammogram does not exclude breast cancer if there is an abnormal finding on physical examination. When clinically indicated, a biopsy should not be postponed because of a normal mammogram report.   Please allow this report to serve as a order for additional imaging follow-up  The images are [place], [place]. [address]  NOTE: If a biopsy is performed on this patient, a copy of the pathology report would be appreciated.   This report was signed and finalized on 1/9/2025 1:54 PM by Amos Greene MD.        I have reviewed the above labs and imaging. ***    Assessment / Plan    Assessment:   There are no diagnoses linked to this encounter.     Sahnon Bean is a 54 y.o. female presents with concern for hormonal imbalance.  Primary symptoms include: hot flashes, vaginal dryness, low libido, weight gain, hair loss and thinning, thinning eyebrows, dry/flaking skin, fatigue, decreased muscle mass, facial hair, dry eyes, and joint pain.      Currently pregnant or trying to conceive-no   Date of last mammogram is 01/07/25.    Date of last menstrual cycle >4 years.  History of endometrial ablation-no  Currently on birth control-no  History of hysterectomy-no  Currently utilizing BHRT or HRT-no  Currently on statins-no  Current smoker-no  Currently on oral nitrites-no  History of breast cancer-no  History of endometrial cancer-no    Educated that hormonal imbalance in women can occur as part of the aging process with olaf/post menopause and genetic predispositions.  Diet, exercise, and stress can also contribute to hormone imbalance.  Workup is customized to the patient's symptoms and history.  Results will determine if patient is eligible for hormone replacement therapy with pellets.  If eligible, hormone options and dosing will be based upon your lab results.  Insurance coverage varies with lab coverage.  Insurance does not cover cost of pellets or insertion, price was discussed  and advised this will need to be paid prior to procedure.  Patient will follow up in 2 weeks to discuss results and next steps.  Labs will need to be obtain in AM.  Patient given [employer] hormone optimization brochure.      History and labs entered in [employer] CDSS.  Recomment: ***    Plan:  Obtain CBC, CMP, estradiol (LC-MS), ferritin, FSH, SHBG, TSH, free T3, free T4, TT (LC-MS), TPO, vitamin B12 and Vitamin D in AM***  Follow up in 2 weeks to discuss results and next steps.      Follow Up:   No follow-ups on file.    Gladis Cutler, MSN, APRN, FNP-C  [facility] [place]

## 2025-01-11 NOTE — PROGRESS NOTES
Office Visit     Patient Name: Shanon Bean  : 1970   MRN: [id number]     Chief Complaint:   Chief Complaint   Patient presents with    lab follow up     Referring Provider: No ref. provider found    Primary Care Provider: Forrest Florez DO     History of Present Illness: Shanon Bean is a 54 y.o. female presents with concern for hormonal imbalance.  Primary symptoms include: hot flashes, vaginal dryness, decreased libido, weight gain, hair loss, thinning, and thinning eyebrows, dry/flaking skin, fatigue, decreased muscle mass, facial hair, dry eyes, and joint pain.    Patient wished to pursue further workup with laboratory studies and is here to discuss results and treatment options.       Subjective   Review of System:   As noted in HPI.    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     Varicella      Past Surgical History:   Procedure Laterality Date     SECTION      x1    CHOLECYSTECTOMY      COLONOSCOPY      D & C HYSTEROSCOPY MYOSURE N/A 9/10/2024    Procedure: DIAGNOSTIC HYSTEROSCOPY WITH POLYPECTOMY;  Surgeon: Blanche West MD;  Location: [facility];  Service: Obstetrics/Gynecology;  Laterality: N/A;    FOOT SURGERY Left     HERNIA REPAIR      SINUS SURGERY      SKIN BIOPSY      TUBAL ABDOMINAL LIGATION      WISDOM TOOTH EXTRACTION       Family History   Problem Relation Age of Onset    Squamous cell carcinoma Father         Skin    Thyroid disease Mother     Parkinsonism Mother         PSP    Breast cancer Paternal  "Grandmother      Social History     Socioeconomic History    Marital status:    Tobacco Use    Smoking status: Former     Current packs/day: 0.00     Average packs/day: 0.5 packs/day for 24.5 years (12.3 ttl pk-yrs)     Types: Cigarettes     Start date: 1999     Quit date: 7/10/2023     Years since quittin.5     Passive exposure: Past    Smokeless tobacco: Never   Vaping Use    Vaping status: Never Used   Substance and Sexual Activity    Alcohol use: Yes     Comment: Occasionally    Drug use: Never    Sexual activity: Not Currently     Birth control/protection: Tubal ligation       Current Outpatient Medications:     buPROPion SR (Wellbutrin SR) 100 MG 12 hr tablet, Take 1 tablet by mouth once daily for 3 days.  If no side effects increase to 1 tablet two times a day., Disp: 180 tablet, Rfl: 1    estradiol (ESTRACE) 0.1 MG/GM vaginal cream, Insert 1 g into the vagina 3 (Three) Times a Week. Apply periurethral and perivaginally 3 times a week at bedtime., Disp: 42.5 g, Rfl: 12    losartan (Cozaar) 50 MG tablet, Take 1 tablet by mouth Daily., Disp: 30 tablet, Rfl: 3    metFORMIN ER (GLUCOPHAGE-XR) 500 MG 24 hr tablet, Take 1 tablet by mouth Daily With Breakfast., Disp: 90 tablet, Rfl: 3    rivaroxaban (Xarelto) 20 MG tablet, Take 1 tablet by mouth Daily with food, Disp: 90 tablet, Rfl: 3    Allergies   Allergen Reactions    Penicillins Other (See Comments)     Joint swelling    Sulfa Antibiotics Rash     Objective   Visit Vitals  /82   Pulse 84   Temp 97 °F (36.1 °C)   Ht 167.6 cm (66\")   Wt 103 kg (227 lb)   SpO2 96%   BMI 36.64 kg/m²        Body mass index is 36.64 kg/m².     Physical Exam  Vitals and nursing note reviewed.   Constitutional:       General: She is awake. She is not in acute distress.     Appearance: She is obese.   Pulmonary:      Effort: Pulmonary effort is normal.   Neurological:      Mental Status: She is alert and oriented to person, place, and time. Mental status is at " "baseline.   Psychiatric:         Mood and Affect: Mood normal.         Behavior: Behavior is cooperative.          Labs  Lab Results   Component Value Date    COLORU Yellow 09/05/2024    CLARITYU Clear 09/05/2024    PHUR 6.0 09/05/2024    LEUKOCYTESUR Trace (A) 09/05/2024    KETONESU Negative 09/05/2024    UROBILINOGEN 0.2 E.U./dL 09/05/2024    BILIRUBINUR Negative 09/05/2024      Lab Results   Component Value Date    WBCUA 3-5 (A) 09/05/2024    WBCUA 6-12 (A) 01/22/2023    RBCUA 0-2 09/05/2024    RBCUA None Seen 01/22/2023    BACTERIA Trace (A) 09/05/2024    BACTERIA Trace (A) 01/22/2023    HYALCASTU None Seen 09/05/2024    HYALCASTU None Seen 01/22/2023    SQUAMEPIUA 0-2 09/05/2024    SQUAMEPIUA 0-2 01/22/2023        Lab Results   Component Value Date    WBC 7.33 12/18/2024    HGB 13.6 12/18/2024    HCT 42.6 12/18/2024    MCV 90.4 12/18/2024     12/18/2024     Lab Results   Component Value Date    GLUCOSE 148 (H) 12/18/2024    CALCIUM 9.7 12/18/2024     12/18/2024    K 4.2 12/18/2024    CO2 24.7 12/18/2024     12/18/2024    BUN 13 12/18/2024    BUN 13 09/05/2024    CREATININE 0.95 12/18/2024    CREATININE 0.83 09/05/2024    EGFR 70.5 12/18/2024    EGFR 82.9 09/05/2024    BCR 13.7 12/18/2024    ANIONGAP 12.3 12/18/2024     (H) 12/18/2024    AST 80 (H) 12/18/2024       Lab Results   Component Value Date    HGBA1C 6.2 (A) 08/01/2024       Lab Results   Component Value Date    TESTFRE 1.0 12/18/2024    ESTRADIOL 6.3 12/18/2024       No results found for: \"ATOPOBIUMV\", \"BVAB2\", \"MEGASPHAER\", \"CALBICANSN\", \"CGLABRATAN\", \"CPARAPSILOS\", \"CLUSITANIAE\", \"CKRUSEI\", \"TRICHVAG\", \"CHLAMNAA\", \"NGONORRHON\", \"UREAPLASMA\", \"MYCOPLASMAG\"    Lab Results   Component Value Date    TSH 3.700 12/18/2024    FREET4 1.02 12/18/2024    T3FREE 2.94 12/18/2024    [id number] 354 01/31/2024    YTXJ26GA 62.8 01/31/2024    CORTISOL 15.00 12/18/2024    TESTOSTERONE 6.2 12/18/2024         Radiographic Studies  Mammo " Screening Digital Tomosynthesis Bilateral With CAD    Result Date: 1/9/2025  No mammographic evidence of malignancy   BI-RADS CATEGORY: 2 , BENIGN FINDING(S).   RECOMMENDED FOLLOW-UP:  Annual screening mammography.    A letter including results and recommendation was sent to the patient. Density notification was provided to patients with type III or type IV breast tissue pattern.  Patient information was entered into a reminder system with a target due date for the next mammogram    NOTES: Mammography does not detect approximately 10-15% of breast cancers. Physical examination of the breasts by a physician and regular monthly breast self examinations are integral parts of breast cancer screening.   A normal mammogram does not exclude breast cancer if there is an abnormal finding on physical examination. When clinically indicated, a biopsy should not be postponed because of a normal mammogram report.   Please allow this report to serve as a order for additional imaging follow-up  [place]. [address]  NOTE: If a biopsy is performed on this patient, a copy of the pathology report would be appreciated.   This report was signed and finalized on 1/9/2025 1:54 PM by Amos Greene MD.        I have reviewed the above labs and imaging.     Assessment / Plan    Assessment:   Diagnoses and all orders for this visit:    1. Chronic fatigue (Primary)    2. Elevated liver enzymes  -     Ambulatory Referral to Gastroenterology    3. Genitourinary syndrome of menopause    4. Hot flashes    5. Weight gain         Shanon Bean is a 54 y.o. female presents with concern for hormonal imbalance.  Primary symptoms include: hot flashes, vaginal dryness, decreased libido, weight gain, hair loss, thinning, and thinning eyebrows, dry/flaking skin, fatigue, decreased muscle mass, facial hair, dry eyes, and joint pain.    Recommended patient continue supplementation of vitamin D, start B12 1000  mcg SL daily, and start estradiol 1 gram vaginal cream 0.1 mg/gm, pea size amount periurethral and perivaginal 3 times a week at bedtime.  Patient has not started B12 supplementation.  Denies issues or side effects with topical estradiol.      Patient wished to pursue further workup with laboratory studies and is here to discuss results and treatment options.      History and labs entered into [email or web address] CDSS.  Recommendations were printed and reviewed with the patient.  Discussed risks and benefits of all recommendations.  All questions answered and patient verbalized understanding of recommendations.  SDM to proceed with the following treatment plan:  Continue estradiol 1 gram vaginal cream 0.1 mg/gm, pea size amount periurethral and perivaginal 3 times a week at bedtime.   Referral to GI for elevated liver enzymes  Healthy diet and exercise  Start B12 SL 1000 mcg daily  Do not recommend hormone replacement therapy due to anti-phospholipid syndrome.  I consulted Dr. Bruno with cardiology and he advised against anabolic steroid supplementation as they have a prothrombotic tendency.  Compliance with recommended follow up and treatment with PCP and/or specialists.      Patient advised OTC supplements are not FDA approved and encouraged patient to research and make an informed decision prior to starting.  Will follow up in 1 year.    Plan:  Refer to GI for elevated liver enzymes  Continue estradiol 1 gram vaginal cream 0.1 mg/gm, pea size amount periurethral and perivaginal 3 times a week at bedtime.   Healthy diet and exercise  Start B12 SL 1000 mcg daily  Continue routine visits with PCP and compliance with labs  Follow up in 1 year or sooner if needed.      Follow Up:   Return in about 1 year (around 1/10/2026) for recheck with Gladis.    Gladis Cutler, MSN, APRN, FNP-C  [facility] [place]

## 2025-01-25 DIAGNOSIS — E11.65 TYPE 2 DIABETES MELLITUS WITH HYPERGLYCEMIA, WITHOUT LONG-TERM CURRENT USE OF INSULIN: ICD-10-CM

## 2025-01-27 RX ORDER — METFORMIN HYDROCHLORIDE 500 MG/1
500 TABLET, EXTENDED RELEASE ORAL
Qty: 90 TABLET | Refills: 3 | OUTPATIENT
Start: 2025-01-27

## 2025-01-28 ENCOUNTER — PATIENT MESSAGE (OUTPATIENT)
Age: 55
End: 2025-01-28
Payer: COMMERCIAL

## 2025-01-28 DIAGNOSIS — F32.A DEPRESSION, UNSPECIFIED DEPRESSION TYPE: ICD-10-CM

## 2025-01-28 DIAGNOSIS — E11.65 TYPE 2 DIABETES MELLITUS WITH HYPERGLYCEMIA, WITHOUT LONG-TERM CURRENT USE OF INSULIN: ICD-10-CM

## 2025-01-28 RX ORDER — BUPROPION HYDROCHLORIDE 100 MG/1
100 TABLET, EXTENDED RELEASE ORAL 2 TIMES DAILY
Qty: 180 TABLET | Refills: 3 | Status: SHIPPED | OUTPATIENT
Start: 2025-01-28

## 2025-01-28 RX ORDER — METFORMIN HYDROCHLORIDE 500 MG/1
500 TABLET, EXTENDED RELEASE ORAL
Qty: 90 TABLET | Refills: 3 | Status: SHIPPED | OUTPATIENT
Start: 2025-01-28

## 2025-01-28 RX ORDER — LOSARTAN POTASSIUM 50 MG/1
50 TABLET ORAL DAILY
Qty: 90 TABLET | Refills: 3 | Status: SHIPPED | OUTPATIENT
Start: 2025-01-28

## 2025-04-21 ENCOUNTER — OFFICE VISIT (OUTPATIENT)
Age: 55
End: 2025-04-21
Payer: COMMERCIAL

## 2025-04-21 VITALS
HEART RATE: 69 BPM | BODY MASS INDEX: 36.48 KG/M2 | DIASTOLIC BLOOD PRESSURE: 80 MMHG | WEIGHT: 227 LBS | OXYGEN SATURATION: 97 % | SYSTOLIC BLOOD PRESSURE: 129 MMHG | HEIGHT: 66 IN | TEMPERATURE: 98.2 F

## 2025-04-21 DIAGNOSIS — F32.A DEPRESSION, UNSPECIFIED DEPRESSION TYPE: ICD-10-CM

## 2025-04-21 DIAGNOSIS — E11.65 TYPE 2 DIABETES MELLITUS WITH HYPERGLYCEMIA, WITHOUT LONG-TERM CURRENT USE OF INSULIN: Primary | ICD-10-CM

## 2025-04-21 LAB
EXPIRATION DATE: ABNORMAL
EXPIRATION DATE: NORMAL
HBA1C MFR BLD: 6.2 % (ref 4.5–5.7)
Lab: ABNORMAL
Lab: NORMAL
POC ALBUMIN, URINE: 10 MG/L
POC CREATININE, URINE: 50 MG/DL
POC URINE ALB/CREA RATIO: NORMAL

## 2025-04-21 PROCEDURE — 90471 IMMUNIZATION ADMIN: CPT | Performed by: FAMILY MEDICINE

## 2025-04-21 PROCEDURE — 82570 ASSAY OF URINE CREATININE: CPT | Performed by: FAMILY MEDICINE

## 2025-04-21 PROCEDURE — 83036 HEMOGLOBIN GLYCOSYLATED A1C: CPT | Performed by: FAMILY MEDICINE

## 2025-04-21 PROCEDURE — 90677 PCV20 VACCINE IM: CPT | Performed by: FAMILY MEDICINE

## 2025-04-21 PROCEDURE — 82044 UR ALBUMIN SEMIQUANTITATIVE: CPT | Performed by: FAMILY MEDICINE

## 2025-04-21 PROCEDURE — 99214 OFFICE O/P EST MOD 30 MIN: CPT | Performed by: FAMILY MEDICINE

## 2025-04-21 NOTE — PROGRESS NOTES
Follow Up Office Visit      Date: 2025   Patient Name: Shanon Bean  : 1970   MRN: [id number]     Chief Complaint:    Chief Complaint   Patient presents with    Diabetes     Follow up         History of Present Illness: Shanon Bean is a 55 y.o. female who is here today for follow up.  States that overall has been doing well.  Does state that she still needs to have her colonoscopy performed.    Depression-states that depression seems to be patient    Answers submitted by the patient for this visit:  Problem not listed (Submitted on 2025)  Chief Complaint: Other medical problem  Reason for appointment: Check up/ follow up  anorexia: No  joint pain: No  change in stool: No  headaches: No  joint swelling: No  vertigo: No  visual change: No  Medications tried: NA  Additional information: NA      Subjective      Review of Systems:   Review of Systems   Constitutional:  Negative for chills, diaphoresis, fatigue and fever.   HENT:  Negative for congestion, sore throat and swollen glands.    Respiratory:  Negative for cough.    Cardiovascular:  Negative for chest pain.   Gastrointestinal:  Negative for abdominal pain, nausea and vomiting.   Genitourinary:  Negative for dysuria.   Musculoskeletal:  Negative for myalgias and neck pain.   Skin:  Negative for rash.   Neurological:  Negative for weakness and numbness.       I have reviewed the patients family history, social history, past medical history, past surgical history and have updated it as appropriate.     Medications:     Current Outpatient Medications:     buPROPion SR (Wellbutrin SR) 100 MG 12 hr tablet, Take 1 tablet by mouth once daily for 3 days.  If no side effects increase to 1 tablet two times a day., Disp: 180 tablet, Rfl: 3    losartan (Cozaar) 50 MG tablet, Take 1 tablet by mouth Daily., Disp: 90 tablet, Rfl: 3    metFORMIN ER (GLUCOPHAGE-XR) 500 MG 24 hr tablet, Take 1 tablet by mouth Daily With Breakfast., Disp: 90 tablet, Rfl: 3     "rivaroxaban (Xarelto) 20 MG tablet, Take 1 tablet by mouth Daily with food, Disp: 90 tablet, Rfl: 3    Allergies:   Allergies   Allergen Reactions    Penicillins Other (See Comments)     Joint swelling    Sulfa Antibiotics Rash       Objective     Physical Exam: Please see above  Vital Signs:   Vitals:    04/21/25 0809   BP: 129/80   Pulse: 69   Temp: 98.2 °F (36.8 °C)   SpO2: 97%   Weight: 103 kg (227 lb)   Height: 167.6 cm (66\")     Body mass index is 36.64 kg/m².    Physical Exam  Vitals and nursing note reviewed.   Constitutional:       Appearance: Normal appearance.   HENT:      Head: Normocephalic and atraumatic.   Eyes:      General: Lids are normal.      Conjunctiva/sclera: Conjunctivae normal.   Cardiovascular:      Rate and Rhythm: Normal rate and regular rhythm.   Pulmonary:      Effort: Pulmonary effort is normal.      Breath sounds: Normal breath sounds and air entry.   Abdominal:      General: Abdomen is flat. Bowel sounds are normal.      Palpations: Abdomen is soft.   Musculoskeletal:      Cervical back: Full passive range of motion without pain and normal range of motion.   Neurological:      General: No focal deficit present.      Mental Status: She is alert and oriented to person, place, and time.   Psychiatric:         Attention and Perception: Attention normal.         Mood and Affect: Mood normal.         Behavior: Behavior normal. Behavior is cooperative.         Procedures    Results:   Labs:   Hemoglobin A1C   Date Value Ref Range Status   04/21/2025 6.2 (A) 4.5 - 5.7 % Final   01/22/2023 6.60 (H) 4.80 - 5.60 % Final     TSH   Date Value Ref Range Status   12/18/2024 3.700 0.270 - 4.200 uIU/mL Final        POCT Results (if applicable):   Results for orders placed or performed in visit on 04/21/25   POC Glycosylated Hemoglobin (Hb A1C)    Collection Time: 04/21/25  8:39 AM    Specimen: Blood   Result Value Ref Range    Hemoglobin A1C 6.2 (A) 4.5 - 5.7 %    Lot Number [id number]     Expiration " Date 2026-12-03    POC Albumin/Creatinine Ratio Urine    Collection Time: 04/21/25  8:48 AM    Specimen: Urine   Result Value Ref Range    POC ALBUMIN, URINE 10 mg/L    POC CREATININE, URINE 50 mg/dL    POC Urine Albumin Creatinine Ratio  <30    Lot Number [id number]     Expiration Date 2026-01-31        Imaging:   No valid procedures specified.         Assessment / Plan      Assessment/Plan:   Diagnoses and all orders for this visit:    1. Type 2 diabetes mellitus with hyperglycemia, without long-term current use of insulin (Primary)  -     POC Glycosylated Hemoglobin (Hb A1C)  -     POC Albumin/Creatinine Ratio Urine    2. Depression, unspecified depression type   - Continue rest of medication.    Other orders  -     Pneumococcal Conjugate Vaccine 20-Valent All              Vaccine Counseling:      Follow Up:   Return in about 6 months (around 10/21/2025) for Annual physical.        Forrest Florez DO  [facility]

## 2025-06-13 ENCOUNTER — PHARMACOGENOMICS (OUTPATIENT)
Dept: PHARMACY | Facility: HOSPITAL | Age: 55
End: 2025-06-13
Payer: COMMERCIAL

## 2025-08-08 ENCOUNTER — CLINICAL SUPPORT (OUTPATIENT)
Dept: INTERNAL MEDICINE | Facility: CLINIC | Age: 55
End: 2025-08-08
Payer: COMMERCIAL

## 2025-08-08 DIAGNOSIS — R10.9 LEFT SIDED ABDOMINAL PAIN: Primary | ICD-10-CM

## 2025-08-08 DIAGNOSIS — E11.65 TYPE 2 DIABETES MELLITUS WITH HYPERGLYCEMIA, WITHOUT LONG-TERM CURRENT USE OF INSULIN: Primary | ICD-10-CM

## 2025-08-08 DIAGNOSIS — R10.9 LEFT SIDED ABDOMINAL PAIN: ICD-10-CM

## 2025-08-08 DIAGNOSIS — E11.65 TYPE 2 DIABETES MELLITUS WITH HYPERGLYCEMIA, WITHOUT LONG-TERM CURRENT USE OF INSULIN: ICD-10-CM

## 2025-08-08 LAB
BILIRUB BLD-MCNC: NEGATIVE MG/DL
CLARITY, POC: ABNORMAL
COLOR UR: YELLOW
EXPIRATION DATE: ABNORMAL
EXPIRATION DATE: ABNORMAL
GLUCOSE UR STRIP-MCNC: NEGATIVE MG/DL
KETONES UR QL: NEGATIVE
LEUKOCYTE EST, POC: ABNORMAL
Lab: ABNORMAL
Lab: ABNORMAL
NITRITE UR-MCNC: NEGATIVE MG/ML
PH UR: 6 [PH] (ref 5–8)
POC ALBUMIN, URINE: 30 MG/L
POC CREATININE, URINE: 50 MG/DL
POC URINE ALB/CREA RATIO: ABNORMAL
PROT UR STRIP-MCNC: NEGATIVE MG/DL
RBC # UR STRIP: NEGATIVE /UL
SP GR UR: 1.01 (ref 1–1.03)
UROBILINOGEN UR QL: NORMAL

## 2025-08-08 PROCEDURE — 81003 URINALYSIS AUTO W/O SCOPE: CPT | Performed by: NURSE PRACTITIONER

## 2025-08-08 PROCEDURE — 82044 UR ALBUMIN SEMIQUANTITATIVE: CPT | Performed by: NURSE PRACTITIONER

## 2025-08-08 PROCEDURE — 82570 ASSAY OF URINE CREATININE: CPT | Performed by: NURSE PRACTITIONER

## 2025-08-10 LAB
BACTERIA UR CULT: ABNORMAL
BACTERIA UR CULT: ABNORMAL

## 2025-08-13 LAB
BACTERIA UR CULT: ABNORMAL
BACTERIA UR CULT: ABNORMAL
OTHER ANTIBIOTIC SUSC ISLT: ABNORMAL

## 2025-08-13 RX ORDER — FLUCONAZOLE 150 MG/1
150 TABLET ORAL ONCE
Qty: 1 TABLET | Refills: 0 | Status: SHIPPED | OUTPATIENT
Start: 2025-08-13 | End: 2025-08-13

## 2025-08-13 RX ORDER — NITROFURANTOIN 25; 75 MG/1; MG/1
100 CAPSULE ORAL EVERY 12 HOURS SCHEDULED
Qty: 14 CAPSULE | Refills: 0 | Status: SHIPPED | OUTPATIENT
Start: 2025-08-13 | End: 2025-08-20

## 2025-08-21 ENCOUNTER — OFFICE VISIT (OUTPATIENT)
Dept: INTERNAL MEDICINE | Facility: CLINIC | Age: 55
End: 2025-08-21
Payer: COMMERCIAL

## 2025-08-21 VITALS
BODY MASS INDEX: 36.64 KG/M2 | WEIGHT: 228 LBS | HEART RATE: 90 BPM | SYSTOLIC BLOOD PRESSURE: 132 MMHG | TEMPERATURE: 96.8 F | OXYGEN SATURATION: 96 % | DIASTOLIC BLOOD PRESSURE: 82 MMHG | HEIGHT: 66 IN

## 2025-08-21 DIAGNOSIS — E66.812 CLASS 2 SEVERE OBESITY DUE TO EXCESS CALORIES WITH SERIOUS COMORBIDITY AND BODY MASS INDEX (BMI) OF 36.0 TO 36.9 IN ADULT: ICD-10-CM

## 2025-08-21 DIAGNOSIS — Z78.0 POST-MENOPAUSAL: ICD-10-CM

## 2025-08-21 DIAGNOSIS — E55.9 VITAMIN D DEFICIENCY: Chronic | ICD-10-CM

## 2025-08-21 DIAGNOSIS — Z13.21 SCREENING FOR ENDOCRINE, NUTRITIONAL, METABOLIC AND IMMUNITY DISORDER: ICD-10-CM

## 2025-08-21 DIAGNOSIS — F41.9 ANXIETY: ICD-10-CM

## 2025-08-21 DIAGNOSIS — Z13.228 SCREENING FOR ENDOCRINE, NUTRITIONAL, METABOLIC AND IMMUNITY DISORDER: ICD-10-CM

## 2025-08-21 DIAGNOSIS — F32.A DEPRESSION, UNSPECIFIED DEPRESSION TYPE: ICD-10-CM

## 2025-08-21 DIAGNOSIS — Z00.00 PHYSICAL EXAM: Primary | ICD-10-CM

## 2025-08-21 DIAGNOSIS — E11.65 TYPE 2 DIABETES MELLITUS WITH HYPERGLYCEMIA, WITHOUT LONG-TERM CURRENT USE OF INSULIN: ICD-10-CM

## 2025-08-21 DIAGNOSIS — R74.8 ELEVATED LIVER ENZYMES: ICD-10-CM

## 2025-08-21 DIAGNOSIS — E66.01 CLASS 2 SEVERE OBESITY DUE TO EXCESS CALORIES WITH SERIOUS COMORBIDITY AND BODY MASS INDEX (BMI) OF 36.0 TO 36.9 IN ADULT: ICD-10-CM

## 2025-08-21 DIAGNOSIS — Z13.0 SCREENING FOR ENDOCRINE, NUTRITIONAL, METABOLIC AND IMMUNITY DISORDER: ICD-10-CM

## 2025-08-21 DIAGNOSIS — D86.9 SARCOIDOSIS: Chronic | ICD-10-CM

## 2025-08-21 DIAGNOSIS — Z13.29 SCREENING FOR ENDOCRINE, NUTRITIONAL, METABOLIC AND IMMUNITY DISORDER: ICD-10-CM

## 2025-08-21 DIAGNOSIS — I10 ESSENTIAL HYPERTENSION: ICD-10-CM

## 2025-08-21 DIAGNOSIS — E88.01 AAT (ALPHA-1-ANTITRYPSIN) DEFICIENCY: Chronic | ICD-10-CM

## 2025-08-21 PROCEDURE — 99396 PREV VISIT EST AGE 40-64: CPT | Performed by: NURSE PRACTITIONER

## 2025-08-21 RX ORDER — LOSARTAN POTASSIUM 50 MG/1
50 TABLET ORAL DAILY
Qty: 90 TABLET | Refills: 3 | Status: SHIPPED | OUTPATIENT
Start: 2025-08-21

## 2025-08-21 RX ORDER — SEMAGLUTIDE 0.68 MG/ML
0.25 INJECTION, SOLUTION SUBCUTANEOUS
Qty: 3 ML | Refills: 3 | Status: SHIPPED | OUTPATIENT
Start: 2025-08-21 | End: 2025-08-25 | Stop reason: SDUPTHER

## 2025-08-21 RX ORDER — BUPROPION HYDROCHLORIDE 300 MG/1
300 TABLET ORAL EVERY MORNING
Qty: 90 TABLET | Refills: 3 | Status: SHIPPED | OUTPATIENT
Start: 2025-08-21

## 2025-08-21 RX ORDER — METFORMIN HYDROCHLORIDE 500 MG/1
500 TABLET, EXTENDED RELEASE ORAL
Qty: 90 TABLET | Refills: 3 | Status: SHIPPED | OUTPATIENT
Start: 2025-08-21

## 2025-08-23 ENCOUNTER — LAB (OUTPATIENT)
Dept: LAB | Facility: HOSPITAL | Age: 55
End: 2025-08-23
Payer: COMMERCIAL

## 2025-08-23 LAB
25(OH)D3 SERPL-MCNC: 30.7 NG/ML (ref 30–100)
ALBUMIN SERPL-MCNC: 4.1 G/DL (ref 3.5–5.2)
ALBUMIN/GLOB SERPL: 1.2 G/DL
ALP SERPL-CCNC: 89 U/L (ref 39–117)
ALT SERPL W P-5'-P-CCNC: 52 U/L (ref 1–33)
ANION GAP SERPL CALCULATED.3IONS-SCNC: 16 MMOL/L (ref 5–15)
AST SERPL-CCNC: 55 U/L (ref 1–32)
BASOPHILS # BLD AUTO: 0.05 10*3/MM3 (ref 0–0.2)
BASOPHILS NFR BLD AUTO: 1 % (ref 0–1.5)
BILIRUB SERPL-MCNC: 0.3 MG/DL (ref 0–1.2)
BUN SERPL-MCNC: 10 MG/DL (ref 6–20)
BUN/CREAT SERPL: 13.3 (ref 7–25)
CALCIUM SPEC-SCNC: 9.5 MG/DL (ref 8.6–10.5)
CHLORIDE SERPL-SCNC: 105 MMOL/L (ref 98–107)
CHOLEST SERPL-MCNC: 164 MG/DL (ref 0–200)
CO2 SERPL-SCNC: 19 MMOL/L (ref 22–29)
CREAT SERPL-MCNC: 0.75 MG/DL (ref 0.57–1)
DEPRECATED RDW RBC AUTO: 44.7 FL (ref 37–54)
EGFRCR SERPLBLD CKD-EPI 2021: 93 ML/MIN/1.73
EOSINOPHIL # BLD AUTO: 0.23 10*3/MM3 (ref 0–0.4)
EOSINOPHIL NFR BLD AUTO: 4.6 % (ref 0.3–6.2)
ERYTHROCYTE [DISTWIDTH] IN BLOOD BY AUTOMATED COUNT: 14 % (ref 12.3–15.4)
GLOBULIN UR ELPH-MCNC: 3.4 GM/DL
GLUCOSE SERPL-MCNC: 130 MG/DL (ref 65–99)
HBA1C MFR BLD: 6.6 % (ref 4.8–5.6)
HCT VFR BLD AUTO: 42.6 % (ref 34–46.6)
HDLC SERPL-MCNC: 36 MG/DL (ref 40–60)
HGB BLD-MCNC: 13.8 G/DL (ref 12–15.9)
IMM GRANULOCYTES # BLD AUTO: 0.01 10*3/MM3 (ref 0–0.05)
IMM GRANULOCYTES NFR BLD AUTO: 0.2 % (ref 0–0.5)
LDLC SERPL CALC-MCNC: 101 MG/DL (ref 0–100)
LDLC/HDLC SERPL: 2.7 {RATIO}
LYMPHOCYTES # BLD AUTO: 1.45 10*3/MM3 (ref 0.7–3.1)
LYMPHOCYTES NFR BLD AUTO: 28.8 % (ref 19.6–45.3)
MCH RBC QN AUTO: 28 PG (ref 26.6–33)
MCHC RBC AUTO-ENTMCNC: 32.4 G/DL (ref 31.5–35.7)
MCV RBC AUTO: 86.4 FL (ref 79–97)
MONOCYTES # BLD AUTO: 0.3 10*3/MM3 (ref 0.1–0.9)
MONOCYTES NFR BLD AUTO: 6 % (ref 5–12)
NEUTROPHILS NFR BLD AUTO: 2.99 10*3/MM3 (ref 1.7–7)
NEUTROPHILS NFR BLD AUTO: 59.4 % (ref 42.7–76)
PLATELET # BLD AUTO: 112 10*3/MM3 (ref 140–450)
PMV BLD AUTO: 10.5 FL (ref 6–12)
POTASSIUM SERPL-SCNC: 4.1 MMOL/L (ref 3.5–5.2)
PROT SERPL-MCNC: 7.5 G/DL (ref 6–8.5)
RBC # BLD AUTO: 4.93 10*6/MM3 (ref 3.77–5.28)
SODIUM SERPL-SCNC: 140 MMOL/L (ref 136–145)
T-UPTAKE NFR SERPL: 1.33 TBI (ref 0.8–1.3)
T4 SERPL-MCNC: 8.51 MCG/DL (ref 4.5–11.7)
TRIGL SERPL-MCNC: 154 MG/DL (ref 0–150)
TSH SERPL DL<=0.05 MIU/L-ACNC: 3.85 UIU/ML (ref 0.27–4.2)
VIT B12 BLD-MCNC: 400 PG/ML (ref 211–946)
VLDLC SERPL-MCNC: 27 MG/DL (ref 5–40)
WBC NRBC COR # BLD AUTO: 5.03 10*3/MM3 (ref 3.4–10.8)

## 2025-08-23 PROCEDURE — 83010 ASSAY OF HAPTOGLOBIN QUANT: CPT | Performed by: NURSE PRACTITIONER

## 2025-08-23 PROCEDURE — 84450 TRANSFERASE (AST) (SGOT): CPT | Performed by: NURSE PRACTITIONER

## 2025-08-23 PROCEDURE — 86800 THYROGLOBULIN ANTIBODY: CPT | Performed by: NURSE PRACTITIONER

## 2025-08-23 PROCEDURE — 84436 ASSAY OF TOTAL THYROXINE: CPT | Performed by: NURSE PRACTITIONER

## 2025-08-23 PROCEDURE — 80050 GENERAL HEALTH PANEL: CPT | Performed by: NURSE PRACTITIONER

## 2025-08-23 PROCEDURE — 82977 ASSAY OF GGT: CPT | Performed by: NURSE PRACTITIONER

## 2025-08-23 PROCEDURE — 82465 ASSAY BLD/SERUM CHOLESTEROL: CPT | Performed by: NURSE PRACTITIONER

## 2025-08-23 PROCEDURE — 83883 ASSAY NEPHELOMETRY NOT SPEC: CPT | Performed by: NURSE PRACTITIONER

## 2025-08-23 PROCEDURE — 84478 ASSAY OF TRIGLYCERIDES: CPT | Performed by: NURSE PRACTITIONER

## 2025-08-23 PROCEDURE — 86376 MICROSOMAL ANTIBODY EACH: CPT | Performed by: NURSE PRACTITIONER

## 2025-08-23 PROCEDURE — 80061 LIPID PANEL: CPT | Performed by: NURSE PRACTITIONER

## 2025-08-23 PROCEDURE — 82306 VITAMIN D 25 HYDROXY: CPT | Performed by: NURSE PRACTITIONER

## 2025-08-23 PROCEDURE — 82172 ASSAY OF APOLIPOPROTEIN: CPT | Performed by: NURSE PRACTITIONER

## 2025-08-23 PROCEDURE — 83036 HEMOGLOBIN GLYCOSYLATED A1C: CPT | Performed by: NURSE PRACTITIONER

## 2025-08-23 PROCEDURE — 82607 VITAMIN B-12: CPT | Performed by: NURSE PRACTITIONER

## 2025-08-23 PROCEDURE — 82947 ASSAY GLUCOSE BLOOD QUANT: CPT | Performed by: NURSE PRACTITIONER

## 2025-08-23 PROCEDURE — 36415 COLL VENOUS BLD VENIPUNCTURE: CPT | Performed by: NURSE PRACTITIONER

## 2025-08-23 PROCEDURE — 84460 ALANINE AMINO (ALT) (SGPT): CPT | Performed by: NURSE PRACTITIONER

## 2025-08-23 PROCEDURE — 82247 BILIRUBIN TOTAL: CPT | Performed by: NURSE PRACTITIONER

## 2025-08-23 PROCEDURE — 84479 ASSAY OF THYROID (T3 OR T4): CPT | Performed by: NURSE PRACTITIONER

## 2025-08-25 ENCOUNTER — PRIOR AUTHORIZATION (OUTPATIENT)
Dept: INTERNAL MEDICINE | Facility: CLINIC | Age: 55
End: 2025-08-25
Payer: COMMERCIAL

## 2025-08-25 DIAGNOSIS — Z12.31 ENCOUNTER FOR SCREENING MAMMOGRAM FOR MALIGNANT NEOPLASM OF BREAST: Primary | ICD-10-CM

## 2025-08-25 DIAGNOSIS — E11.65 TYPE 2 DIABETES MELLITUS WITH HYPERGLYCEMIA, WITHOUT LONG-TERM CURRENT USE OF INSULIN: ICD-10-CM

## 2025-08-25 LAB
THYROGLOB AB SERPL-ACNC: <1 IU/ML (ref 0–0.9)
THYROPEROXIDASE AB SERPL-ACNC: 11 IU/ML (ref 0–34)

## 2025-08-25 RX ORDER — SEMAGLUTIDE 0.68 MG/ML
0.25 INJECTION, SOLUTION SUBCUTANEOUS
Qty: 3 ML | Refills: 0 | COMMUNITY
Start: 2025-08-25

## 2025-08-26 LAB
A2 MACROGLOB SERPL-MCNC: 190 MG/DL (ref 110–276)
ALT SERPL W P-5'-P-CCNC: 61 IU/L (ref 0–40)
APO A-I SERPL-MCNC: 125 MG/DL (ref 116–209)
AST SERPL W P-5'-P-CCNC: 62 IU/L (ref 0–40)
BILIRUB SERPL-MCNC: 0.3 MG/DL (ref 0–1.2)
CHOLEST SERPL-MCNC: 194 MG/DL (ref 100–199)
FIBROSIS SCORING:: ABNORMAL
FIBROSIS STAGE SERPL QL: ABNORMAL
GGT SERPL-CCNC: 57 IU/L (ref 0–60)
GLUCOSE SERPL-MCNC: 127 MG/DL (ref 70–99)
HAPTOGLOB SERPL-MCNC: 92 MG/DL (ref 33–346)
LABORATORY COMMENT REPORT: ABNORMAL
LIVER FIBR SCORE SERPL CALC.FIBROSURE: 0.25 (ref 0–0.21)
LIVER STEATOSIS GRADE SERPL QL: ABNORMAL
LIVER STEATOSIS SCORE SERPL: 0.81 (ref 0–0.4)
NASH GRADE SERPL QL: ABNORMAL
NASH INTERPRETATION SERPL-IMP: ABNORMAL
NASH SCORE SERPL: 0.74 (ref 0–0.25)
NASH SCORING: ABNORMAL
STEATOSIS SCORING: ABNORMAL
TEST PERFORMANCE INFO SPEC: ABNORMAL
TEST PERFORMANCE INFO SPEC: ABNORMAL
TRIGL SERPL-MCNC: 196 MG/DL (ref 0–149)

## (undated) DEVICE — SLV SCD CALF HEMOFORCE DVT THERP REPROC MD

## (undated) DEVICE — SOL IRR H2O BTL 1000ML STRL

## (undated) DEVICE — NDL HYPO ECLPS SFTY 22G 1IN

## (undated) DEVICE — DRAPE UNDERBUTTOCKS W FLUID POUCH 40X44IN

## (undated) DEVICE — GLV SURG ULTRATOUCH BIOGEL/COAT PF LF SZ6 STRL

## (undated) DEVICE — SEAL HYSTERSCOPE/OUTFLOW CHANNEL MYOSURE

## (undated) DEVICE — RICH MINOR LITHOTOMY: Brand: MEDLINE INDUSTRIES, INC.

## (undated) DEVICE — GLV SURG SENSICARE POLYISPRN W/ALOE PF LF 6.5 GRN STRL